# Patient Record
Sex: FEMALE | Race: ASIAN | Employment: FULL TIME | URBAN - METROPOLITAN AREA
[De-identification: names, ages, dates, MRNs, and addresses within clinical notes are randomized per-mention and may not be internally consistent; named-entity substitution may affect disease eponyms.]

---

## 2019-04-02 ENCOUNTER — TELEPHONE (OUTPATIENT)
Dept: SURGERY | Age: 29
End: 2019-04-02

## 2019-04-02 ENCOUNTER — OFFICE VISIT (OUTPATIENT)
Dept: SURGERY | Age: 29
End: 2019-04-02

## 2019-04-02 VITALS
SYSTOLIC BLOOD PRESSURE: 115 MMHG | RESPIRATION RATE: 16 BRPM | HEIGHT: 63 IN | HEART RATE: 61 BPM | WEIGHT: 146 LBS | OXYGEN SATURATION: 100 % | TEMPERATURE: 98.9 F | BODY MASS INDEX: 25.87 KG/M2 | DIASTOLIC BLOOD PRESSURE: 71 MMHG

## 2019-04-02 DIAGNOSIS — K80.20 SYMPTOMATIC CHOLELITHIASIS: Primary | ICD-10-CM

## 2019-04-02 RX ORDER — DESOGESTREL AND ETHINYL ESTRADIOL 0.15-0.03
KIT ORAL
Refills: 5 | COMMUNITY
Start: 2019-03-09 | End: 2021-05-25

## 2019-04-02 RX ORDER — OMEPRAZOLE 20 MG/1
CAPSULE, DELAYED RELEASE ORAL
Refills: 1 | COMMUNITY
Start: 2019-03-10 | End: 2019-05-10

## 2019-04-02 NOTE — PROGRESS NOTES
[facility] General Surgery History and Physical    History of Present Illness:      Denece Brunner is a 33 y.o. female who has been having epigastric and RUQ pain for the past few months. The pain seems to be random and sometimes worse when she is not eating. She says the pain is a 5 of 10 when it is bad. She has some associated nausea and bloating. She has been having normal BM.   She had an EGD by Dr Demetri Hidalgo which shows mild gastritis and she is on omeprazole for this    PMH - none    PSH - none    Current Outpatient Medications:     omeprazole (PRILOSEC) 20 mg capsule, TAKE ONE CAPSULE BY MOUTH EVERY DAY BEFORE BREAKFAST, Disp: , Rfl: 1    APRI 0.15-0.03 mg tab, TAKE 1 TABLET BY MOUTH EVERY DAY, Disp: , Rfl: 5    No Known Allergies    Social History     Socioeconomic History    Marital status:      Spouse name: Not on file    Number of children: Not on file    Years of education: Not on file    Highest education level: Not on file   Occupational History    Not on file   Social Needs    Financial resource strain: Not on file    Food insecurity:     Worry: Not on file     Inability: Not on file    Transportation needs:     Medical: Not on file     Non-medical: Not on file   Tobacco Use    Smoking status: Not on file   Substance and Sexual Activity    Alcohol use: Not on file    Drug use: Not on file    Sexual activity: Not on file   Lifestyle    Physical activity:     Days per week: Not on file     Minutes per session: Not on file    Stress: Not on file   Relationships    Social connections:     Talks on phone: Not on file     Gets together: Not on file     Attends [employer] service: Not on file     Active member of club or organization: Not on file     Attends meetings of clubs or organizations: Not on file     Relationship status: Not on file    Intimate partner violence:     Fear of current or ex partner: Not on file     Emotionally abused: Not on file     Physically abused: Not on file     Forced sexual activity: Not on file   Other Topics Concern    Not on file   Social History Narrative    Not on file       No family history on file. ROS   Constitutional: negative  Ears, Nose, Mouth, Throat, and Face: negative  Respiratory: negative  Cardiovascular: negative  Gastrointestinal: positive for nausea and abdominal pain  Genitourinary:negative  Integument/Breast: negative  Hematologic/Lymphatic: negative  Behavioral/Psychiatric: negative  Allergic/Immunologic: negative      Physical Exam:     Visit Vitals  /71 (BP 1 Location: Left arm, BP Patient Position: Sitting)   Pulse 61   Temp 98.9 °F (37.2 °C) (Oral)   Resp 16   Ht 5' 3\" (1.6 m)   Wt 146 lb (66.2 kg)   SpO2 100%   BMI 25.86 kg/m²       General - alert and oriented, no apparent distress  HEENT - no jaundice, no hearing imparement  Pulm - CTAB, no C/W/R  CV - RRR, no M/R/G  Abd - soft, ND, BS present, mild TTP in RUQ, no guarding, no hernia  Ext - pulses intact in UE and LE bilaterally, no edema  Skin - supple, no rashes  Psychiatric - normal affect, good mood    Labs  LFTs normal on outside labs    Imaging  RUQ US - 7mm stone in the neck of the GB, normal CBD, normal GB wall  I have reviewed and agree with all of the pertinent images    Assessment:     Adan Bacon is a 33 y.o. female with symptomatic cholelithiasis    Recommendations:     1. She has a gallstone that appears to be causing her pain and will need laparoscopic cholecystectomy in the OR. I have discussed the above procedure with the patient in detail. We reviewed the benefits and possible complications of the surgery which include bleeding, infection, damage to adjacent organs, venous thromboembolism, need for repeat surgery, death and other unforseen complications. The patient agreed to proceed with the surgery. Dionicio Ibarra MD    Ms. Terence Jim has a reminder for a \"due or due soon\" health maintenance.  I have asked that she contact her primary care provider for follow-up on this health maintenance.

## 2019-04-02 NOTE — H&P (VIEW-ONLY)
[facility] General Surgery History and Physical 
 
History of Present Illness:  
  
Patrick Draper is a 33 y.o. female who has been having epigastric and RUQ pain for the past few months. The pain seems to be random and sometimes worse when she is not eating. She says the pain is a 5 of 10 when it is bad. She has some associated nausea and bloating. She has been having normal BM. She had an EGD by Dr Eric Lee which shows mild gastritis and she is on omeprazole for this PMH - none PSH - none Current Outpatient Medications:  
  omeprazole (PRILOSEC) 20 mg capsule, TAKE ONE CAPSULE BY MOUTH EVERY DAY BEFORE BREAKFAST, Disp: , Rfl: 1   APRI 0.15-0.03 mg tab, TAKE 1 TABLET BY MOUTH EVERY DAY, Disp: , Rfl: 5 No Known Allergies Social History Socioeconomic History  Marital status:  Spouse name: Not on file  Number of children: Not on file  Years of education: Not on file  Highest education level: Not on file Occupational History  Not on file Social Needs  Financial resource strain: Not on file  Food insecurity:  
  Worry: Not on file Inability: Not on file  Transportation needs:  
  Medical: Not on file Non-medical: Not on file Tobacco Use  Smoking status: Not on file Substance and Sexual Activity  Alcohol use: Not on file  Drug use: Not on file  Sexual activity: Not on file Lifestyle  Physical activity:  
  Days per week: Not on file Minutes per session: Not on file  Stress: Not on file Relationships  Social connections:  
  Talks on phone: Not on file Gets together: Not on file Attends [employer] service: Not on file Active member of club or organization: Not on file Attends meetings of clubs or organizations: Not on file Relationship status: Not on file  Intimate partner violence:  
  Fear of current or ex partner: Not on file Emotionally abused: Not on file Physically abused: Not on file Forced sexual activity: Not on file Other Topics Concern  Not on file Social History Narrative  Not on file No family history on file. ROS Constitutional: negative Ears, Nose, Mouth, Throat, and Face: negative Respiratory: negative Cardiovascular: negative Gastrointestinal: positive for nausea and abdominal pain Genitourinary:negative Integument/Breast: negative Hematologic/Lymphatic: negative Behavioral/Psychiatric: negative Allergic/Immunologic: negative Physical Exam:  
 
Visit Vitals /71 (BP 1 Location: Left arm, BP Patient Position: Sitting) Pulse 61 Temp 98.9 °F (37.2 °C) (Oral) Resp 16 Ht 5' 3\" (1.6 m) Wt 146 lb (66.2 kg) SpO2 100% BMI 25.86 kg/m² General - alert and oriented, no apparent distress HEENT - no jaundice, no hearing imparement Pulm - CTAB, no C/W/R 
CV - RRR, no M/R/G Abd - soft, ND, BS present, mild TTP in RUQ, no guarding, no hernia Ext - pulses intact in UE and LE bilaterally, no edema Skin - supple, no rashes Psychiatric - normal affect, good mood Labs LFTs normal on outside labs Imaging RUQ US - 7mm stone in the neck of the GB, normal CBD, normal GB wall I have reviewed and agree with all of the pertinent images Assessment:  
 
Adan Bacon is a 33 y.o. female with symptomatic cholelithiasis Recommendations:  
 
1. She has a gallstone that appears to be causing her pain and will need laparoscopic cholecystectomy in the OR. I have discussed the above procedure with the patient in detail. We reviewed the benefits and possible complications of the surgery which include bleeding, infection, damage to adjacent organs, venous thromboembolism, need for repeat surgery, death and other unforseen complications. The patient agreed to proceed with the surgery. Dionicio Ibarra MD Ms. Terencezina Jim has a reminder for a \"due or due soon\" health maintenance.  I have asked that she contact her primary care provider for follow-up on this health maintenance.

## 2019-04-02 NOTE — PROGRESS NOTES
1. Have you been to the ER, urgent care clinic since your last visit? Hospitalized since your last visit? No    2. Have you seen or consulted any other health care providers outside of the [address] [place] [address] since your last visit? Include any pap smears or colon screening.  No

## 2019-04-08 ENCOUNTER — ANESTHESIA EVENT (OUTPATIENT)
Dept: SURGERY | Age: 29
End: 2019-04-08
Payer: COMMERCIAL

## 2019-04-11 ENCOUNTER — ANESTHESIA (OUTPATIENT)
Dept: SURGERY | Age: 29
End: 2019-04-11
Payer: COMMERCIAL

## 2019-04-11 ENCOUNTER — HOSPITAL ENCOUNTER (OUTPATIENT)
Age: 29
Setting detail: OUTPATIENT SURGERY
Discharge: HOME | End: 2019-04-11
Attending: SURGERY | Admitting: SURGERY
Payer: COMMERCIAL

## 2019-04-11 VITALS
SYSTOLIC BLOOD PRESSURE: 107 MMHG | HEIGHT: 63 IN | WEIGHT: 146 LBS | HEART RATE: 73 BPM | BODY MASS INDEX: 25.87 KG/M2 | RESPIRATION RATE: 17 BRPM | TEMPERATURE: 98.2 F | DIASTOLIC BLOOD PRESSURE: 56 MMHG | OXYGEN SATURATION: 99 %

## 2019-04-11 DIAGNOSIS — K80.20 SYMPTOMATIC CHOLELITHIASIS: Primary | ICD-10-CM

## 2019-04-11 LAB — HCG UR QL: NEGATIVE

## 2019-04-11 PROCEDURE — 77030009852 HC PCH RTVR ENDOSC COVD -B: Performed by: SURGERY

## 2019-04-11 PROCEDURE — 74011000250 HC RX REV CODE- 250

## 2019-04-11 PROCEDURE — 77030008684 HC TU ET CUF COVD -B: Performed by: NURSE ANESTHETIST, CERTIFIED REGISTERED

## 2019-04-11 PROCEDURE — 77030026438 HC STYL ET INTUB CARD -A: Performed by: NURSE ANESTHETIST, CERTIFIED REGISTERED

## 2019-04-11 PROCEDURE — 77030002895 HC DEV VASC CLOSR COVD -B: Performed by: SURGERY

## 2019-04-11 PROCEDURE — 76210000021 HC REC RM PH II 0.5 TO 1 HR: Performed by: SURGERY

## 2019-04-11 PROCEDURE — 77030020053 HC ELECTRD LAPSCP COVD -B: Performed by: SURGERY

## 2019-04-11 PROCEDURE — 74011000250 HC RX REV CODE- 250: Performed by: SURGERY

## 2019-04-11 PROCEDURE — 74011250637 HC RX REV CODE- 250/637: Performed by: ANESTHESIOLOGY

## 2019-04-11 PROCEDURE — 76210000017 HC OR PH I REC 1.5 TO 2 HR: Performed by: SURGERY

## 2019-04-11 PROCEDURE — 76010000149 HC OR TIME 1 TO 1.5 HR: Performed by: SURGERY

## 2019-04-11 PROCEDURE — 76060000033 HC ANESTHESIA 1 TO 1.5 HR: Performed by: SURGERY

## 2019-04-11 PROCEDURE — 74011250636 HC RX REV CODE- 250/636

## 2019-04-11 PROCEDURE — 88304 TISSUE EXAM BY PATHOLOGIST: CPT

## 2019-04-11 PROCEDURE — 77030035051: Performed by: SURGERY

## 2019-04-11 PROCEDURE — 74011250637 HC RX REV CODE- 250/637: Performed by: SURGERY

## 2019-04-11 PROCEDURE — 77030011640 HC PAD GRND REM COVD -A: Performed by: SURGERY

## 2019-04-11 PROCEDURE — 77030020782 HC GWN BAIR PAWS FLX 3M -B

## 2019-04-11 PROCEDURE — 77030035048 HC TRCR ENDOSC OPTCL COVD -B: Performed by: SURGERY

## 2019-04-11 PROCEDURE — 77030035045 HC TRCR ENDOSC VRSPRT BLDLSS COVD -B: Performed by: SURGERY

## 2019-04-11 PROCEDURE — 77030012029 HC APPL CLP LIG COVD -C: Performed by: SURGERY

## 2019-04-11 PROCEDURE — 77030008756 HC TU IRR SUC STRY -B: Performed by: SURGERY

## 2019-04-11 PROCEDURE — 77030037032 HC INSRT SCIS CLICKLLINE DISP STOR -B: Performed by: SURGERY

## 2019-04-11 PROCEDURE — 77030002933 HC SUT MCRYL J&J -A: Performed by: SURGERY

## 2019-04-11 PROCEDURE — 77030040361 HC SLV COMPR DVT MDII -B: Performed by: SURGERY

## 2019-04-11 PROCEDURE — 74011250636 HC RX REV CODE- 250/636: Performed by: ANESTHESIOLOGY

## 2019-04-11 PROCEDURE — 77030020747 HC TU INSUF ENDOSC TELE -A: Performed by: SURGERY

## 2019-04-11 PROCEDURE — 77030031139 HC SUT VCRL2 J&J -A: Performed by: SURGERY

## 2019-04-11 PROCEDURE — 74011250636 HC RX REV CODE- 250/636: Performed by: SURGERY

## 2019-04-11 PROCEDURE — 77030008771 HC TU NG SALEM SUMP -A: Performed by: NURSE ANESTHETIST, CERTIFIED REGISTERED

## 2019-04-11 PROCEDURE — 81025 URINE PREGNANCY TEST: CPT

## 2019-04-11 PROCEDURE — 77030020263 HC SOL INJ SOD CL0.9% LFCR 1000ML: Performed by: SURGERY

## 2019-04-11 PROCEDURE — 77030039266 HC ADH SKN EXOFIN S2SG -A: Performed by: SURGERY

## 2019-04-11 RX ORDER — OXYCODONE AND ACETAMINOPHEN 5; 325 MG/1; MG/1
1-2 TABLET ORAL
Qty: 40 TAB | Refills: 0 | Status: SHIPPED | OUTPATIENT
Start: 2019-04-11 | End: 2019-04-14

## 2019-04-11 RX ORDER — MIDAZOLAM HYDROCHLORIDE 1 MG/ML
1 INJECTION, SOLUTION INTRAMUSCULAR; INTRAVENOUS AS NEEDED
Status: DISCONTINUED | OUTPATIENT
Start: 2019-04-11 | End: 2019-04-11 | Stop reason: HOSPADM

## 2019-04-11 RX ORDER — GLYCOPYRROLATE 0.6MG/3ML
SYRINGE (ML) INTRAVENOUS AS NEEDED
Status: DISCONTINUED | OUTPATIENT
Start: 2019-04-11 | End: 2019-04-11 | Stop reason: HOSPADM

## 2019-04-11 RX ORDER — ROCURONIUM BROMIDE 10 MG/ML
INJECTION, SOLUTION INTRAVENOUS AS NEEDED
Status: DISCONTINUED | OUTPATIENT
Start: 2019-04-11 | End: 2019-04-11 | Stop reason: HOSPADM

## 2019-04-11 RX ORDER — DEXAMETHASONE SODIUM PHOSPHATE 4 MG/ML
INJECTION, SOLUTION INTRA-ARTICULAR; INTRALESIONAL; INTRAMUSCULAR; INTRAVENOUS; SOFT TISSUE AS NEEDED
Status: DISCONTINUED | OUTPATIENT
Start: 2019-04-11 | End: 2019-04-11 | Stop reason: HOSPADM

## 2019-04-11 RX ORDER — ROPIVACAINE HYDROCHLORIDE 5 MG/ML
30 INJECTION, SOLUTION EPIDURAL; INFILTRATION; PERINEURAL ONCE
Status: DISCONTINUED | OUTPATIENT
Start: 2019-04-11 | End: 2019-04-11 | Stop reason: HOSPADM

## 2019-04-11 RX ORDER — SODIUM CHLORIDE 0.9 % (FLUSH) 0.9 %
5-40 SYRINGE (ML) INJECTION AS NEEDED
Status: DISCONTINUED | OUTPATIENT
Start: 2019-04-11 | End: 2019-04-11 | Stop reason: HOSPADM

## 2019-04-11 RX ORDER — SUCCINYLCHOLINE CHLORIDE 20 MG/ML
INJECTION INTRAMUSCULAR; INTRAVENOUS AS NEEDED
Status: DISCONTINUED | OUTPATIENT
Start: 2019-04-11 | End: 2019-04-11 | Stop reason: HOSPADM

## 2019-04-11 RX ORDER — BUPIVACAINE HYDROCHLORIDE AND EPINEPHRINE 5; 5 MG/ML; UG/ML
INJECTION, SOLUTION EPIDURAL; INTRACAUDAL; PERINEURAL AS NEEDED
Status: DISCONTINUED | OUTPATIENT
Start: 2019-04-11 | End: 2019-04-11 | Stop reason: HOSPADM

## 2019-04-11 RX ORDER — MORPHINE SULFATE 10 MG/ML
2 INJECTION, SOLUTION INTRAMUSCULAR; INTRAVENOUS
Status: DISCONTINUED | OUTPATIENT
Start: 2019-04-11 | End: 2019-04-11 | Stop reason: HOSPADM

## 2019-04-11 RX ORDER — SODIUM CHLORIDE 0.9 % (FLUSH) 0.9 %
5-40 SYRINGE (ML) INJECTION EVERY 8 HOURS
Status: DISCONTINUED | OUTPATIENT
Start: 2019-04-11 | End: 2019-04-11 | Stop reason: HOSPADM

## 2019-04-11 RX ORDER — ACETAMINOPHEN 325 MG/1
650 TABLET ORAL ONCE
Status: COMPLETED | OUTPATIENT
Start: 2019-04-11 | End: 2019-04-11

## 2019-04-11 RX ORDER — FENTANYL CITRATE 50 UG/ML
25 INJECTION, SOLUTION INTRAMUSCULAR; INTRAVENOUS
Status: DISCONTINUED | OUTPATIENT
Start: 2019-04-11 | End: 2019-04-11 | Stop reason: HOSPADM

## 2019-04-11 RX ORDER — DEXMEDETOMIDINE HYDROCHLORIDE 4 UG/ML
INJECTION, SOLUTION INTRAVENOUS AS NEEDED
Status: DISCONTINUED | OUTPATIENT
Start: 2019-04-11 | End: 2019-04-11 | Stop reason: HOSPADM

## 2019-04-11 RX ORDER — SODIUM CHLORIDE, SODIUM LACTATE, POTASSIUM CHLORIDE, CALCIUM CHLORIDE 600; 310; 30; 20 MG/100ML; MG/100ML; MG/100ML; MG/100ML
75 INJECTION, SOLUTION INTRAVENOUS CONTINUOUS
Status: DISCONTINUED | OUTPATIENT
Start: 2019-04-11 | End: 2019-04-11 | Stop reason: HOSPADM

## 2019-04-11 RX ORDER — SODIUM CHLORIDE 9 MG/ML
25 INJECTION, SOLUTION INTRAVENOUS CONTINUOUS
Status: DISCONTINUED | OUTPATIENT
Start: 2019-04-11 | End: 2019-04-11 | Stop reason: HOSPADM

## 2019-04-11 RX ORDER — KETOROLAC TROMETHAMINE 30 MG/ML
INJECTION, SOLUTION INTRAMUSCULAR; INTRAVENOUS AS NEEDED
Status: DISCONTINUED | OUTPATIENT
Start: 2019-04-11 | End: 2019-04-11 | Stop reason: HOSPADM

## 2019-04-11 RX ORDER — MIDAZOLAM HYDROCHLORIDE 1 MG/ML
0.5 INJECTION, SOLUTION INTRAMUSCULAR; INTRAVENOUS
Status: DISCONTINUED | OUTPATIENT
Start: 2019-04-11 | End: 2019-04-11 | Stop reason: HOSPADM

## 2019-04-11 RX ORDER — SODIUM CHLORIDE, SODIUM LACTATE, POTASSIUM CHLORIDE, CALCIUM CHLORIDE 600; 310; 30; 20 MG/100ML; MG/100ML; MG/100ML; MG/100ML
125 INJECTION, SOLUTION INTRAVENOUS CONTINUOUS
Status: DISCONTINUED | OUTPATIENT
Start: 2019-04-11 | End: 2019-04-11 | Stop reason: HOSPADM

## 2019-04-11 RX ORDER — FENTANYL CITRATE 50 UG/ML
50 INJECTION, SOLUTION INTRAMUSCULAR; INTRAVENOUS AS NEEDED
Status: DISCONTINUED | OUTPATIENT
Start: 2019-04-11 | End: 2019-04-11 | Stop reason: HOSPADM

## 2019-04-11 RX ORDER — FENTANYL CITRATE 50 UG/ML
INJECTION, SOLUTION INTRAMUSCULAR; INTRAVENOUS AS NEEDED
Status: DISCONTINUED | OUTPATIENT
Start: 2019-04-11 | End: 2019-04-11 | Stop reason: HOSPADM

## 2019-04-11 RX ORDER — PROPOFOL 10 MG/ML
INJECTION, EMULSION INTRAVENOUS AS NEEDED
Status: DISCONTINUED | OUTPATIENT
Start: 2019-04-11 | End: 2019-04-11 | Stop reason: HOSPADM

## 2019-04-11 RX ORDER — ONDANSETRON 2 MG/ML
INJECTION INTRAMUSCULAR; INTRAVENOUS AS NEEDED
Status: DISCONTINUED | OUTPATIENT
Start: 2019-04-11 | End: 2019-04-11 | Stop reason: HOSPADM

## 2019-04-11 RX ORDER — LIDOCAINE HYDROCHLORIDE 10 MG/ML
0.1 INJECTION, SOLUTION EPIDURAL; INFILTRATION; INTRACAUDAL; PERINEURAL AS NEEDED
Status: DISCONTINUED | OUTPATIENT
Start: 2019-04-11 | End: 2019-04-11 | Stop reason: HOSPADM

## 2019-04-11 RX ORDER — MIDAZOLAM HYDROCHLORIDE 1 MG/ML
INJECTION, SOLUTION INTRAMUSCULAR; INTRAVENOUS AS NEEDED
Status: DISCONTINUED | OUTPATIENT
Start: 2019-04-11 | End: 2019-04-11 | Stop reason: HOSPADM

## 2019-04-11 RX ORDER — LIDOCAINE HYDROCHLORIDE 20 MG/ML
INJECTION, SOLUTION EPIDURAL; INFILTRATION; INTRACAUDAL; PERINEURAL AS NEEDED
Status: DISCONTINUED | OUTPATIENT
Start: 2019-04-11 | End: 2019-04-11 | Stop reason: HOSPADM

## 2019-04-11 RX ORDER — CEFAZOLIN SODIUM/WATER 2 G/20 ML
2 SYRINGE (ML) INTRAVENOUS ONCE
Status: COMPLETED | OUTPATIENT
Start: 2019-04-11 | End: 2019-04-11

## 2019-04-11 RX ORDER — SODIUM CHLORIDE, SODIUM LACTATE, POTASSIUM CHLORIDE, CALCIUM CHLORIDE 600; 310; 30; 20 MG/100ML; MG/100ML; MG/100ML; MG/100ML
INJECTION, SOLUTION INTRAVENOUS
Status: DISCONTINUED | OUTPATIENT
Start: 2019-04-11 | End: 2019-04-11 | Stop reason: HOSPADM

## 2019-04-11 RX ORDER — ONDANSETRON 2 MG/ML
4 INJECTION INTRAMUSCULAR; INTRAVENOUS AS NEEDED
Status: DISCONTINUED | OUTPATIENT
Start: 2019-04-11 | End: 2019-04-11 | Stop reason: HOSPADM

## 2019-04-11 RX ORDER — ONDANSETRON 4 MG/1
4 TABLET, ORALLY DISINTEGRATING ORAL
Qty: 30 TAB | Refills: 0 | Status: SHIPPED | OUTPATIENT
Start: 2019-04-11 | End: 2019-05-10

## 2019-04-11 RX ORDER — NEOSTIGMINE METHYLSULFATE 1 MG/ML
INJECTION INTRAVENOUS AS NEEDED
Status: DISCONTINUED | OUTPATIENT
Start: 2019-04-11 | End: 2019-04-11 | Stop reason: HOSPADM

## 2019-04-11 RX ORDER — HYDROMORPHONE HYDROCHLORIDE 1 MG/ML
0.2 INJECTION, SOLUTION INTRAMUSCULAR; INTRAVENOUS; SUBCUTANEOUS
Status: DISCONTINUED | OUTPATIENT
Start: 2019-04-11 | End: 2019-04-11 | Stop reason: HOSPADM

## 2019-04-11 RX ORDER — DIPHENHYDRAMINE HYDROCHLORIDE 50 MG/ML
12.5 INJECTION, SOLUTION INTRAMUSCULAR; INTRAVENOUS AS NEEDED
Status: DISCONTINUED | OUTPATIENT
Start: 2019-04-11 | End: 2019-04-11 | Stop reason: HOSPADM

## 2019-04-11 RX ORDER — OXYCODONE AND ACETAMINOPHEN 5; 325 MG/1; MG/1
1 TABLET ORAL ONCE
Status: COMPLETED | OUTPATIENT
Start: 2019-04-11 | End: 2019-04-11

## 2019-04-11 RX ORDER — EPHEDRINE SULFATE 50 MG/ML
INJECTION, SOLUTION INTRAVENOUS AS NEEDED
Status: DISCONTINUED | OUTPATIENT
Start: 2019-04-11 | End: 2019-04-11 | Stop reason: HOSPADM

## 2019-04-11 RX ADMIN — DEXMEDETOMIDINE HYDROCHLORIDE 2 MCG: 4 INJECTION, SOLUTION INTRAVENOUS at 12:57

## 2019-04-11 RX ADMIN — Medication 2 G: at 12:26

## 2019-04-11 RX ADMIN — NEOSTIGMINE METHYLSULFATE 1 MG: 1 INJECTION INTRAVENOUS at 12:58

## 2019-04-11 RX ADMIN — DEXMEDETOMIDINE HYDROCHLORIDE 4 MCG: 4 INJECTION, SOLUTION INTRAVENOUS at 12:53

## 2019-04-11 RX ADMIN — PROPOFOL 150 MG: 10 INJECTION, EMULSION INTRAVENOUS at 12:21

## 2019-04-11 RX ADMIN — FENTANYL CITRATE 50 MCG: 50 INJECTION, SOLUTION INTRAMUSCULAR; INTRAVENOUS at 12:11

## 2019-04-11 RX ADMIN — SODIUM CHLORIDE, SODIUM LACTATE, POTASSIUM CHLORIDE, AND CALCIUM CHLORIDE 125 ML/HR: 600; 310; 30; 20 INJECTION, SOLUTION INTRAVENOUS at 11:13

## 2019-04-11 RX ADMIN — Medication 0.2 MG: at 12:56

## 2019-04-11 RX ADMIN — DEXAMETHASONE SODIUM PHOSPHATE 8 MG: 4 INJECTION, SOLUTION INTRA-ARTICULAR; INTRALESIONAL; INTRAMUSCULAR; INTRAVENOUS; SOFT TISSUE at 12:27

## 2019-04-11 RX ADMIN — FENTANYL CITRATE 50 MCG: 50 INJECTION, SOLUTION INTRAMUSCULAR; INTRAVENOUS at 12:18

## 2019-04-11 RX ADMIN — SUCCINYLCHOLINE CHLORIDE 120 MG: 20 INJECTION INTRAMUSCULAR; INTRAVENOUS at 12:21

## 2019-04-11 RX ADMIN — KETOROLAC TROMETHAMINE 30 MG: 30 INJECTION, SOLUTION INTRAMUSCULAR; INTRAVENOUS at 12:56

## 2019-04-11 RX ADMIN — DEXMEDETOMIDINE HYDROCHLORIDE 2 MCG: 4 INJECTION, SOLUTION INTRAVENOUS at 12:58

## 2019-04-11 RX ADMIN — SODIUM CHLORIDE, SODIUM LACTATE, POTASSIUM CHLORIDE, CALCIUM CHLORIDE: 600; 310; 30; 20 INJECTION, SOLUTION INTRAVENOUS at 12:11

## 2019-04-11 RX ADMIN — LIDOCAINE HYDROCHLORIDE 50 MG: 20 INJECTION, SOLUTION EPIDURAL; INFILTRATION; INTRACAUDAL; PERINEURAL at 12:21

## 2019-04-11 RX ADMIN — ONDANSETRON 4 MG: 2 INJECTION INTRAMUSCULAR; INTRAVENOUS at 12:25

## 2019-04-11 RX ADMIN — ACETAMINOPHEN 650 MG: 325 TABLET ORAL at 11:16

## 2019-04-11 RX ADMIN — EPHEDRINE SULFATE 10 MG: 50 INJECTION, SOLUTION INTRAVENOUS at 12:44

## 2019-04-11 RX ADMIN — NEOSTIGMINE METHYLSULFATE 1 MG: 1 INJECTION INTRAVENOUS at 12:57

## 2019-04-11 RX ADMIN — MIDAZOLAM HYDROCHLORIDE 2 MG: 1 INJECTION, SOLUTION INTRAMUSCULAR; INTRAVENOUS at 12:11

## 2019-04-11 RX ADMIN — ROCURONIUM BROMIDE 5 MG: 10 INJECTION, SOLUTION INTRAVENOUS at 12:21

## 2019-04-11 RX ADMIN — Medication 0.2 MG: at 12:58

## 2019-04-11 RX ADMIN — OXYCODONE AND ACETAMINOPHEN 1 TABLET: 5; 325 TABLET ORAL at 15:29

## 2019-04-11 NOTE — ANESTHESIA PREPROCEDURE EVALUATION
Relevant Problems   No relevant active problems       Anesthetic History   No history of anesthetic complications            Review of Systems / Medical History  Patient summary reviewed, nursing notes reviewed and pertinent labs reviewed    Pulmonary  Within defined limits                 Neuro/Psych   Within defined limits           Cardiovascular  Within defined limits                     GI/Hepatic/Renal  Within defined limits              Endo/Other  Within defined limits           Other Findings              Physical Exam    Airway  Mallampati: I  TM Distance: > 6 cm  Neck ROM: normal range of motion   Mouth opening: Normal     Cardiovascular  Regular rate and rhythm,  S1 and S2 normal,  no murmur, click, rub, or gallop             Dental  No notable dental hx       Pulmonary  Breath sounds clear to auscultation               Abdominal  GI exam deferred       Other Findings            Anesthetic Plan    ASA: 2  Anesthesia type: general          Induction: Intravenous  Anesthetic plan and risks discussed with: Patient

## 2019-04-11 NOTE — DISCHARGE INSTRUCTIONS
Laparoscopic cholecystectomy      Patient Discharge Instructions    Wagner Douglas / [id number] : 1990    Admitted 2019 Discharged: 2019       PATIENT INSTRUCTIONS  GALLBLADDER SURGERY  (CHOLECYSTECTOMY)    FOLLOW-UP:  Please make an appointment with your physician in 10 - 14 day(s). Call your physician immediately if you have any fevers greater than 101.5, drainage from your wound that is not clear or looks infected, persistent bleeding, increasing abdominal pain, problems urinating, or persistent nausea/vomiting. You should be aware that you may have right shoulder pain after surgery and that this will progressively go away. This is called 'referred pain' and is from the area of the gallbladder. It can also be caused by gas that may be trapped under the diaphragm from the surgery, especially if it was performed laparoscopically through mini-incisions. This gas will progressively get reabsorbed by your body. WOUND CARE INSTRUCTIONS:   You may shower at home. If clothing rubs against the wound or causes irritation and the wound is not draining you may cover it with a dry dressing during the daytime. Try to keep the wound dry and avoid ointments on the wound unless directed to do so. If the wound becomes bright red and painful or starts to drain infected material that is not clear, please contact your physician immediately. You should also call if you begin to drain fluid that is thin and greenish-brown from the wound and appears to look like bile. If the wound though is mildly pink and has a thick firm ridge underneath it, this is normal, and is referred to as a healing ridge. This will resolve over the next 4-6 weeks. Place an ice pack on the navel incision for the next 48 hours. After that, you may use a heating pad if you feel muscle tightening or pulling. DIET:  You may eat any foods that you can tolerate.   It is a good idea to eat a high fiber diet and take in plenty of fluids to prevent constipation. If you do become constipated you may want to take a mild laxative or take ducolax tablets on a daily basis until your bowel habits are regular. Constipation can be very uncomfortable, along with straining, after recent abdominal surgery. ACTIVITY:  You are encouraged to cough and deep breath or use your incentive spirometer if you were given one, every 15-30 minutes when awake. This will help prevent respiratory complications and low grade fevers post-operatively. You may want to hug a pillow when coughing and sneezing to add additional support to the surgical area(s) which will decrease pain during these times. You are encouraged to walk and engage in light activity for the next two weeks. You should not lift more than 20 pounds during this time frame as it could put you at increased risk for a post-operative hernia. Twenty pounds is roughly equivalent to a plastic bag of groceries. · Most people are able to return to work within 1 to 2 weeks after surgery. · You may shower 24 hours after surgery. Pat the cut (incision) dry. Do not take a bath for the first week. · Your doctor will tell you when you can have sex again. MEDICATIONS:  Try to take narcotic medications and anti-inflammatory medications, such as tylenol, ibuprofen, naprosyn, etc., with food. This will minimize stomach upset from the medication. Should you develop nausea and vomiting from the pain medication, or develop a rash, please discontinue the medication and contact your physician. You should not drive, make important decisions, or operate machinery when taking narcotic pain medication. · Take ibuprofen (Motrin) as scheduled then combine with oxycodone/acetaminophen (Percocet, Roxicet, Tylox) as needed for severe pain. QUESTIONS:  Please feel free to call Dr. Rosie Mendez office ([phone]) if you have any questions, and they will be glad to assist you. Follow-up with Dr. Tomi Mike in 2 week(s). Call the office to schedule your appointment. Information obtained by :    I understand that if any problems occur once I am at home I am to contact my physician. I understand and acknowledge receipt of the instructions indicated above. Physician's or R.N.'s Signature                                                                  Date/Time                                                                                                                                              Patient or Representative Signature                                                          Date/Time     ______________________________________________________________________    Anesthesia Discharge Instructions    After general anesthesia or intervenous sedation, for 24 hours or while taking prescription Narcotics:  · Limit your activities  · Do not drive or operate hazardous machinery  · If you have not urinated within 8 hours after discharge, please contact your surgeon on call. · Do not make important personal or business decisions  · Do not drink alcoholic beverages    Report the following to your surgeon:  · Excessive pain, swelling, redness or odor of or around the surgical area  · Temperature over 100.5 degrees  · Nausea and vomiting lasting longer than 4 hours or if unable to take medication  · Any signs of decreased circulation or nerve impairment to extremity:  Change in color, persistent numbness, tingling, coldness or increased pain.   · Any questions

## 2019-04-11 NOTE — PERIOP NOTES
Patient: Kurt Nguyen MRN: [id number]  SSN: [id number]   YOB: 1990  Age: 33 y.o. Sex: female     Patient is status post Procedure(s):  LAPAROSCOPIC CHOLECYSTECTOMY.     Surgeon(s) and Role:     * Carlos Alberto Porras MD - Primary    Local/Dose/Irrigation:  0.5% BUPIVACAINE W EPI                   Peripheral IV 04/11/19 Left Hand (Active)   Site Assessment Clean, dry, & intact 4/11/2019 11:12 AM   Phlebitis Assessment 0 4/11/2019 11:12 AM   Infiltration Assessment 0 4/11/2019 11:12 AM   Dressing Status New 4/11/2019 11:12 AM   Dressing Type Tape;Transparent 4/11/2019 11:12 AM   Hub Color/Line Status Pink 4/11/2019 11:12 AM                           Dressing/Packing:  Wound Abdomen 4 PORT SITES-Dressing Type: Topical skin adhesive/glue (04/11/19 1300)    Splint/Cast:  ]    Other:

## 2019-04-11 NOTE — ANESTHESIA POSTPROCEDURE EVALUATION
Procedure(s):  LAPAROSCOPIC CHOLECYSTECTOMY. general    Anesthesia Post Evaluation        Patient location during evaluation: PACU  Patient participation: complete - patient participated  Level of consciousness: awake and alert  Pain management: adequate  Airway patency: patent  Anesthetic complications: no  Cardiovascular status: acceptable  Respiratory status: acceptable  Hydration status: acceptable  Comments: I have seen and evaluated the patient and is ready for discharge.  Mindy Klein MD    Post anesthesia nausea and vomiting:  none      Vitals Value Taken Time   /56 4/11/2019  3:00 PM   Temp 36.9 °C (98.4 °F) 4/11/2019  2:35 PM   Pulse 73 4/11/2019  3:07 PM   Resp 17 4/11/2019  3:07 PM   SpO2 99 % 4/11/2019  3:07 PM

## 2019-04-11 NOTE — BRIEF OP NOTE
BRIEF OPERATIVE NOTE    Date of Procedure: 4/11/2019   Preoperative Diagnosis: SYMPTOMATIC CHOLELITHIASIS  Postoperative Diagnosis: SYMPTOMATIC CHOLELITHIASIS    Procedure(s):  LAPAROSCOPIC CHOLECYSTECTOMY  Surgeon(s) and Role:     Sebastián Caldwell MD - Primary         Surgical Assistant: KARRI Og    Surgical Staff:  Circ-1: Amaya Aj RN  Circ-Intern: Iron Mason RN  Physician Assistant: Romana Kitchen, PA  Scrub Tech-1: Jose Patel  Retractor Herrera: Digna Jalloh  Event Time In Time Out   Incision Start 1237    Incision Close 1303      Anesthesia: General   Estimated Blood Loss: none  Specimens:   ID Type Source Tests Collected by Time Destination   1 : GALLBLADDER Fresh Gallbladder  Elsa Gil MD 4/11/2019 1301 Pathology      Findings: normal appearing GB with small stones   Complications: none  Implants: * No implants in log *

## 2019-04-11 NOTE — INTERVAL H&P NOTE
H&P Update:  Jorge Parikh was seen and examined. History and physical has been reviewed. The patient has been examined. There have been no significant clinical changes since the completion of the originally dated History and Physical.  Patient identified by surgeon; surgical site was confirmed by patient and surgeon.

## 2019-04-12 NOTE — OP NOTES
[facility]  OPERATIVE REPORT    Name:  Eligio Rayo  MR#:  [id number]  :  1990  ACCOUNT #:  [de-identified]  DATE OF SERVICE:  2019      PREOPERATIVE DIAGNOSIS:  Symptomatic cholelithiasis. POSTOPERATIVE DIAGNOSIS:  Symptomatic cholelithiasis. PROCEDURE PERFORMED:  Laparoscopic cholecystectomy. SURGEON:  Maykel Mancini MD    ASSISTANT:  KARRI Mathur    ANESTHESIA:  General.    COMPLICATIONS:  None. SPECIMENS REMOVED:  Gallbladder. IMPLANTS:  None. ESTIMATED BLOOD LOSS:  None. FINDINGS:  Normal-appearing gallbladder with small stones. INDICATIONS FOR THE OPERATION:  The patient is a 50-year-old female who has been having epigastric and right upper quadrant abdominal pain. She has had a preoperative endoscopy which was normal.  She has stones in the gallbladder and is needing laparoscopic cholecystectomy in the operating room. DESCRIPTION OF THE OPERATION:  The patient was met in the preop holding area. The H and P was updated. Consent was signed. All risks and benefits were explained to the patient prior to the start of the operation. She was taken back to the operating room. She was lying in a supine position. The abdomen was prepped and draped in standard sterile fashion. Time-out was called. Antibiotics were given. SCDs were on lower extremities. I started the operation by making a 5-mm incision into the right upper quadrant inserting a VisiPort trocar in the intraabdominal cavity, insufflating to 15 mmHg. We then placed a 13-TZ periumbilical trocar, 5-mm subxiphoid trocar and a 5-mm right-sided trocar. We then grasped the gallbladder, pushed it up and over the liver. We then dissected down to the infundibulum of the gallbladder dissecting free the cystic duct and cystic artery. We identified both structures clearly going into the gallbladder with no intervening structures in between. A critical view of safety was obtained.   We could see the junction of the cystic duct and common bile duct. We then completed our dissection around the cystic duct. We placed 2 clips on the cystic artery on the patient's side, 1 on the distal side and cut in between. We placed 3 clips on the cystic duct on the patient's side, one on the distal side and cut in between. We then removed the gallbladder from the gallbladder fossa with hook cautery cauterizing any bleeding from liver bed along the way. We then removed the gallbladder from the 12-mm EndoCatch bag. We looked up in the right upper quadrant. There was a little bit of spillage of bile from the dissection of the gallbladder fossa which was suctioned and irrigated out with 500 mL of saline irrigation. The gallbladder fossa was hemostatic and our clips were all in place. We then closed our 93-ZY periumbilical trocar site fascial defect with an Endoclose suture passing device in an interrupted figure-of-eight fashion. We then desufflated the abdominal cavity, removed the trocars and closed the skin with 4-0 Monocryl and Dermabond to complete the operation. Dr. Gaetano Sams was present and scrubbed during the entire operation. The counts were correct.         Chavez Kan MD      [id number]  D:  04/11/2019 13:14  T:  04/11/2019 13:16  JOB #:  [id number]

## 2019-05-10 ENCOUNTER — OFFICE VISIT (OUTPATIENT)
Dept: SURGERY | Age: 29
End: 2019-05-10

## 2019-05-10 VITALS
HEART RATE: 62 BPM | DIASTOLIC BLOOD PRESSURE: 78 MMHG | RESPIRATION RATE: 16 BRPM | BODY MASS INDEX: 25.87 KG/M2 | WEIGHT: 146 LBS | HEIGHT: 63 IN | OXYGEN SATURATION: 99 % | TEMPERATURE: 98 F | SYSTOLIC BLOOD PRESSURE: 106 MMHG

## 2019-05-10 DIAGNOSIS — Z09 FOLLOW-UP EXAMINATION AFTER ABDOMINAL SURGERY: Primary | ICD-10-CM

## 2019-05-10 NOTE — PATIENT INSTRUCTIONS
Ok to remove the glue and wash as normal     Ok to swim     Ok to use moisturizer on the scars     Avoid sun exposure and use sun block on the incisions     For bowels add magnesium 250 mg every day     Ok to add a women's multivitamin with iron every day

## 2019-05-10 NOTE — PROGRESS NOTES
1. Have you been to the ER, urgent care clinic since your last visit? Hospitalized since your last visit? No    2. Have you seen or consulted any other health care providers outside of the [address] [place] [address] since your last visit? Include any pap smears or colon screening.  No

## 2019-05-10 NOTE — PROGRESS NOTES
HISTORY OF PRESENT ILLNESS  Karie Maxwell is a 33 y.o. female. HPI  Chief Complaint   Patient presents with    Post OP Follow Up     4 weeks post lap ankush       Review of Systems   Constitutional: Negative for chills, fever and malaise/fatigue. Feels well      Respiratory: Negative for cough and shortness of breath. Cardiovascular: Negative for chest pain, palpitations and leg swelling. Gastrointestinal: Negative for abdominal pain, blood in stool, constipation, diarrhea, heartburn, nausea and vomiting. Genitourinary: Negative for dysuria. Musculoskeletal: Negative for myalgias. Started waking and running again    Neurological: Negative for dizziness. Endo/Heme/Allergies: Does not bruise/bleed easily. Physical Exam   Constitutional: She is oriented to person, place, and time. No distress. /78 (BP 1 Location: Left arm, BP Patient Position: Sitting)   Pulse 62   Temp 98 °F (36.7 °C) (Oral)   Resp 16   Ht 5' 3\" (1.6 m)   Wt 146 lb (66.2 kg)   LMP  (LMP Unknown)   SpO2 99%   BMI 25.86 kg/m²   Appears well      Cardiovascular: Normal rate and regular rhythm. Pulmonary/Chest: Effort normal and breath sounds normal. No respiratory distress. Abdominal: Soft. Bowel sounds are normal. She exhibits no distension. There is no tenderness. Well healed lap sites    Musculoskeletal: Normal range of motion. She exhibits no edema. Neurological: She is alert and oriented to person, place, and time. Skin: Skin is warm and dry. She is not diaphoretic. Psychiatric: She has a normal mood and affect. ASSESSMENT and PLAN    ICD-10-CM ICD-9-CM    1.  Follow-up examination after abdominal surgery Z09 V67.09      Doing well 4 weeks s/p laparoscopic cholecystectomy   Pathology reviewed   Diet as desired   Activity no restrictions  Ok to add MVI and magnesium if desired   Discharged from surgical care with prn follow up   Karie Maxwell verbalized understanding and questions were answered to the best of my knowledge and ability. Healthy lifestyle  educational materials were provided.

## 2020-10-30 LAB
ANTIBODY SCREEN, EXTERNAL: NEGATIVE
CHLAMYDIA, EXTERNAL: NEGATIVE
HBSAG, EXTERNAL: NEGATIVE
HEPATITIS C AB,   EXT: NEGATIVE
HIV, EXTERNAL: NORMAL
N. GONORRHEA, EXTERNAL: NEGATIVE
RUBELLA, EXTERNAL: NORMAL
T. PALLIDUM, EXTERNAL: NORMAL
TYPE, ABO & RH, EXTERNAL: NORMAL

## 2021-05-07 LAB — GRBS, EXTERNAL: NEGATIVE

## 2021-05-22 ENCOUNTER — HOSPITAL ENCOUNTER (INPATIENT)
Age: 31
LOS: 3 days | Discharge: HOME | End: 2021-05-25
Attending: OBSTETRICS & GYNECOLOGY | Admitting: ADVANCED PRACTICE MIDWIFE
Payer: COMMERCIAL

## 2021-05-22 PROBLEM — Z34.90 PREGNANCY: Status: ACTIVE | Noted: 2021-05-22

## 2021-05-22 LAB
A1 MICROGLOB PLACENTAL VAG QL: POSITIVE
BASOPHILS # BLD: 0 K/UL (ref 0–0.1)
BASOPHILS NFR BLD: 0 % (ref 0–1)
CONTROL LINE PRESENT?: NORMAL
COVID-19 RAPID TEST, COVR: NOT DETECTED
DAILY QC (YES/NO)?: YES
DIFFERENTIAL METHOD BLD: ABNORMAL
EOSINOPHIL # BLD: 0.1 K/UL (ref 0–0.4)
EOSINOPHIL NFR BLD: 1 % (ref 0–7)
ERYTHROCYTE [DISTWIDTH] IN BLOOD BY AUTOMATED COUNT: 13.9 % (ref 11.5–14.5)
EXPIRATION DATE: NORMAL
HCT VFR BLD AUTO: 32 % (ref 35–47)
HGB BLD-MCNC: 10.2 G/DL (ref 11.5–16)
IMM GRANULOCYTES # BLD AUTO: 0.1 K/UL (ref 0–0.04)
IMM GRANULOCYTES NFR BLD AUTO: 1 % (ref 0–0.5)
INTERNAL NEGATIVE CONTROL: NORMAL
KIT LOT NO.: NORMAL
LYMPHOCYTES # BLD: 2.3 K/UL (ref 0.8–3.5)
LYMPHOCYTES NFR BLD: 22 % (ref 12–49)
MCH RBC QN AUTO: 26 PG (ref 26–34)
MCHC RBC AUTO-ENTMCNC: 31.9 G/DL (ref 30–36.5)
MCV RBC AUTO: 81.4 FL (ref 80–99)
MONOCYTES # BLD: 1 K/UL (ref 0–1)
MONOCYTES NFR BLD: 9 % (ref 5–13)
NEUTS SEG # BLD: 6.9 K/UL (ref 1.8–8)
NEUTS SEG NFR BLD: 67 % (ref 32–75)
NRBC # BLD: 0 K/UL (ref 0–0.01)
NRBC BLD-RTO: 0 PER 100 WBC
PH, VAGINAL FLUID: 5 (ref 5–6.1)
PLATELET # BLD AUTO: 291 K/UL (ref 150–400)
PMV BLD AUTO: 10.4 FL (ref 8.9–12.9)
RBC # BLD AUTO: 3.93 M/UL (ref 3.8–5.2)
SARS-COV-2, COV2: NORMAL
SOURCE, COVRS: NORMAL
WBC # BLD AUTO: 10.3 K/UL (ref 3.6–11)

## 2021-05-22 PROCEDURE — 85025 COMPLETE CBC W/AUTO DIFF WBC: CPT

## 2021-05-22 PROCEDURE — 99285 EMERGENCY DEPT VISIT HI MDM: CPT

## 2021-05-22 PROCEDURE — 84112 EVAL AMNIOTIC FLUID PROTEIN: CPT | Performed by: ADVANCED PRACTICE MIDWIFE

## 2021-05-22 PROCEDURE — 65270000029 HC RM PRIVATE

## 2021-05-22 PROCEDURE — 87635 SARS-COV-2 COVID-19 AMP PRB: CPT

## 2021-05-22 PROCEDURE — 74011250637 HC RX REV CODE- 250/637: Performed by: ADVANCED PRACTICE MIDWIFE

## 2021-05-22 PROCEDURE — 36415 COLL VENOUS BLD VENIPUNCTURE: CPT

## 2021-05-22 PROCEDURE — 83986 ASSAY PH BODY FLUID NOS: CPT | Performed by: ADVANCED PRACTICE MIDWIFE

## 2021-05-22 RX ORDER — SODIUM CHLORIDE, SODIUM LACTATE, POTASSIUM CHLORIDE, CALCIUM CHLORIDE 600; 310; 30; 20 MG/100ML; MG/100ML; MG/100ML; MG/100ML
125 INJECTION, SOLUTION INTRAVENOUS CONTINUOUS
Status: DISCONTINUED | OUTPATIENT
Start: 2021-05-22 | End: 2021-05-23

## 2021-05-22 RX ORDER — MAG HYDROX/ALUMINUM HYD/SIMETH 200-200-20
30 SUSPENSION, ORAL (FINAL DOSE FORM) ORAL
Status: DISCONTINUED | OUTPATIENT
Start: 2021-05-22 | End: 2021-05-23

## 2021-05-22 RX ORDER — NALOXONE HYDROCHLORIDE 0.4 MG/ML
0.4 INJECTION, SOLUTION INTRAMUSCULAR; INTRAVENOUS; SUBCUTANEOUS AS NEEDED
Status: DISCONTINUED | OUTPATIENT
Start: 2021-05-22 | End: 2021-05-23

## 2021-05-22 RX ORDER — OXYTOCIN/RINGER'S LACTATE 30/500 ML
10 PLASTIC BAG, INJECTION (ML) INTRAVENOUS AS NEEDED
Status: COMPLETED | OUTPATIENT
Start: 2021-05-22 | End: 2021-05-23

## 2021-05-22 RX ORDER — ZOLPIDEM TARTRATE 5 MG/1
5 TABLET ORAL
Status: DISCONTINUED | OUTPATIENT
Start: 2021-05-22 | End: 2021-05-23

## 2021-05-22 RX ORDER — BUTORPHANOL TARTRATE 1 MG/ML
2 INJECTION INTRAMUSCULAR; INTRAVENOUS
Status: DISCONTINUED | OUTPATIENT
Start: 2021-05-22 | End: 2021-05-23

## 2021-05-22 RX ORDER — OXYTOCIN/RINGER'S LACTATE 30/500 ML
87.3 PLASTIC BAG, INJECTION (ML) INTRAVENOUS AS NEEDED
Status: COMPLETED | OUTPATIENT
Start: 2021-05-22 | End: 2021-05-23

## 2021-05-22 RX ORDER — SODIUM CHLORIDE 0.9 % (FLUSH) 0.9 %
5-40 SYRINGE (ML) INJECTION EVERY 8 HOURS
Status: DISCONTINUED | OUTPATIENT
Start: 2021-05-22 | End: 2021-05-23

## 2021-05-22 RX ORDER — SODIUM CHLORIDE 0.9 % (FLUSH) 0.9 %
5-40 SYRINGE (ML) INJECTION AS NEEDED
Status: DISCONTINUED | OUTPATIENT
Start: 2021-05-22 | End: 2021-05-23

## 2021-05-22 RX ADMIN — MISOPROSTOL 50 MCG: 100 TABLET ORAL at 21:08

## 2021-05-22 NOTE — ED PROVIDER NOTES
Brayden Rodrigues is a 34 yo  at 38w3d with an JUMANA of 21. She presents to the SLO[facility] for possible PROM at 0500. Reports she wok up this morning to wet underwear and has felt a couple small gushes of clear fluid since that time, enough to soak two pads. Reports some like pink discharge this afternoon. Pt reports occasional ctx, but not painful. Endorses good fetal movement. Prenatal care has been received at [facility] with Dr. Trevor Ny. Pregnancy has been uncomplicated. No past medical history on file. Past Surgical History:   Procedure Laterality Date    HX LAP CHOLECYSTECTOMY  2019         Family History:   Problem Relation Age of Onset    Diabetes Father     Cancer Paternal Uncle        Social History     Socioeconomic History    Marital status:      Spouse name: Not on file    Number of children: Not on file    Years of education: Not on file    Highest education level: Not on file   Occupational History    Not on file   Tobacco Use    Smoking status: Never Smoker    Smokeless tobacco: Never Used   Substance and Sexual Activity    Alcohol use: Yes     Comment: occassional    Drug use: Never    Sexual activity: Not on file   Other Topics Concern    Not on file   Social History Narrative    Not on file     Social Determinants of Health     Financial Resource Strain:     Difficulty of Paying Living Expenses:    Food Insecurity:     Worried About Running Out of Food in the Last Year:     [address] in the Last Year:    Transportation Needs:     Lack of Transportation (Medical):      Lack of Transportation (Non-Medical):    Physical Activity:     Days of Exercise per Week:     Minutes of Exercise per Session:    Stress:     Feeling of Stress :    Social Connections:     Frequency of Communication with Friends and Family:     Frequency of Social Gatherings with Friends and Family:     Attends [employer] Services:     Active Member of Clubs or Organizations:     Attends Club or Organization Meetings:     Marital Status:    Intimate Partner Violence:     Fear of Current or Ex-Partner:     Emotionally Abused:     Physically Abused:     Sexually Abused: ALLERGIES: Patient has no known allergies. Review of Systems   Constitutional: Negative. HENT: Negative. Eyes: Negative. Respiratory: Negative. Cardiovascular: Negative. Gastrointestinal: Negative. Endocrine: Negative. Genitourinary: Positive for vaginal discharge. Musculoskeletal: Negative. Skin: Negative. Allergic/Immunologic: Negative. Neurological: Negative. Hematological: Negative. Psychiatric/Behavioral: Negative. Vitals:    05/22/21 1916   BP: 115/64   Pulse: 73   Resp: 16   Temp: 98.8 °F (37.1 °C)   Weight: 85.7 kg (189 lb)   Height: 5' 3\" (1.6 m)            Physical Exam  Vitals and nursing note reviewed. Exam conducted with a chaperone present. Constitutional:       Appearance: Normal appearance. She is obese. HENT:      Head: Normocephalic and atraumatic. Nose: Nose normal.      Mouth/Throat:      Mouth: Mucous membranes are moist.      Pharynx: Oropharynx is clear. Eyes:      Extraocular Movements: Extraocular movements intact. Cardiovascular:      Rate and Rhythm: Normal rate and regular rhythm. Pulses: Normal pulses. Heart sounds: Normal heart sounds. Pulmonary:      Effort: Pulmonary effort is normal.      Breath sounds: Normal breath sounds. Abdominal:      Palpations: Abdomen is soft. Comments: Gravid, ctx palpate mild, resting tone soft   Genitourinary:     Comments: SVE: 1/50/-2, vertex, ruptured    Nitrazine neg  Amnisure positive  Musculoskeletal:         General: Normal range of motion. Cervical back: Normal range of motion and neck supple. Skin:     General: Skin is warm and dry. Capillary Refill: Capillary refill takes less than 2 seconds. Neurological:      General: No focal deficit present. Mental Status: She is alert and oriented to person, place, and time. Mental status is at baseline. Psychiatric:         Mood and Affect: Mood normal.         Behavior: Behavior normal.         Thought Content:  Thought content normal.         Judgment: Judgment normal.      NST: Monitored for 20 minutes, reactive, cat 1, baseline 155, positive accels, no decels, moderate variability, ctx q 7-8 min, mild to palpation, resting tone soft    Patient Vitals for the past 4 hrs:   Temp Pulse Resp BP   05/22/21 1916 98.8 °F (37.1 °C) 73 16 115/64         MDM  Number of Diagnoses or Management Options     Amount and/or Complexity of Data Reviewed  Decide to obtain previous medical records or to obtain history from someone other than the patient: yes  Review and summarize past medical records: yes  Independent visualization of images, tracings, or specimens: yes    Risk of Complications, Morbidity, and/or Mortality  Presenting problems: moderate  Diagnostic procedures: moderate  Management options: moderate    Critical Care  Total time providing critical care: < 30 minutes    Patient Progress  Patient progress: stable    ED Course as of May 22 1943   Sat May 22, 2021   1941 Admit to SLOAN  NST  Nitrazine neg  Amnisure positive  SVE: 1/50/-2, vertex  Admit to l and d for cytotec and labor/srom management    [LA]      ED Course User Index  [LA] Yara Swift CNM

## 2021-05-22 NOTE — PROGRESS NOTES
1907 Patient received to SLOAN # 3 under services of Dr. Arsen Goldberg complaining of leaking fluid from her vagina onset 0500 today. States she having irregular  Contractions  1912 External fetal monitor applied  1920 Nitrazine to perineum negative  1923 ESTEFANY Wyatt notified of patient's arrival to [facility] ESTEFANY Bermudez Likes at bedside, viewing FHR tracing  1930 Amnisure positive  1933 SVE by Maricel Guzman CNM 1/50/-2  1935 Bedside and Verbal shift change report given to DALIA Jimenes RN   by [facility]. Report included the following information SBAR and Recent Results.

## 2021-05-23 ENCOUNTER — ANESTHESIA (OUTPATIENT)
Dept: LABOR AND DELIVERY | Age: 31
End: 2021-05-23
Payer: COMMERCIAL

## 2021-05-23 ENCOUNTER — ANESTHESIA EVENT (OUTPATIENT)
Dept: LABOR AND DELIVERY | Age: 31
End: 2021-05-23
Payer: COMMERCIAL

## 2021-05-23 PROCEDURE — 0KQM0ZZ REPAIR PERINEUM MUSCLE, OPEN APPROACH: ICD-10-PCS | Performed by: OBSTETRICS & GYNECOLOGY

## 2021-05-23 PROCEDURE — 77030031139 HC SUT VCRL2 J&J -A

## 2021-05-23 PROCEDURE — A4300 CATH IMPL VASC ACCESS PORTAL: HCPCS

## 2021-05-23 PROCEDURE — 76060000078 HC EPIDURAL ANESTHESIA

## 2021-05-23 PROCEDURE — 74011000250 HC RX REV CODE- 250: Performed by: ANESTHESIOLOGY

## 2021-05-23 PROCEDURE — 74011000250 HC RX REV CODE- 250

## 2021-05-23 PROCEDURE — 77030019905 HC CATH URETH INTMIT MDII -A

## 2021-05-23 PROCEDURE — 75410000003 HC RECOV DEL/VAG/CSECN EA 0.5 HR

## 2021-05-23 PROCEDURE — 75410000002 HC LABOR FEE PER 1 HR

## 2021-05-23 PROCEDURE — 74011000258 HC RX REV CODE- 258: Performed by: ADVANCED PRACTICE MIDWIFE

## 2021-05-23 PROCEDURE — 74011250637 HC RX REV CODE- 250/637: Performed by: ADVANCED PRACTICE MIDWIFE

## 2021-05-23 PROCEDURE — 74011250636 HC RX REV CODE- 250/636: Performed by: ADVANCED PRACTICE MIDWIFE

## 2021-05-23 PROCEDURE — 00HU33Z INSERTION OF INFUSION DEVICE INTO SPINAL CANAL, PERCUTANEOUS APPROACH: ICD-10-PCS | Performed by: ANESTHESIOLOGY

## 2021-05-23 PROCEDURE — 75410000000 HC DELIVERY VAGINAL/SINGLE

## 2021-05-23 PROCEDURE — 65410000002 HC RM PRIVATE OB

## 2021-05-23 RX ORDER — NALOXONE HYDROCHLORIDE 0.4 MG/ML
0.4 INJECTION, SOLUTION INTRAMUSCULAR; INTRAVENOUS; SUBCUTANEOUS AS NEEDED
Status: DISCONTINUED | OUTPATIENT
Start: 2021-05-23 | End: 2021-05-23

## 2021-05-23 RX ORDER — EPHEDRINE SULFATE/0.9% NACL/PF 50 MG/5 ML
12.5 SYRINGE (ML) INTRAVENOUS
Status: DISCONTINUED | OUTPATIENT
Start: 2021-05-23 | End: 2021-05-23

## 2021-05-23 RX ORDER — FENTANYL/BUPIVACAINE/NS/PF 2-1250MCG
PREFILLED PUMP RESERVOIR EPIDURAL
Status: COMPLETED
Start: 2021-05-23 | End: 2021-05-23

## 2021-05-23 RX ORDER — NALOXONE HYDROCHLORIDE 0.4 MG/ML
0.4 INJECTION, SOLUTION INTRAMUSCULAR; INTRAVENOUS; SUBCUTANEOUS AS NEEDED
Status: DISCONTINUED | OUTPATIENT
Start: 2021-05-23 | End: 2021-05-25 | Stop reason: HOSPADM

## 2021-05-23 RX ORDER — BUPIVACAINE HYDROCHLORIDE 5 MG/ML
INJECTION, SOLUTION EPIDURAL; INTRACAUDAL
Status: COMPLETED | OUTPATIENT
Start: 2021-05-23 | End: 2021-05-23

## 2021-05-23 RX ORDER — OXYTOCIN/RINGER'S LACTATE 30/500 ML
10 PLASTIC BAG, INJECTION (ML) INTRAVENOUS AS NEEDED
Status: DISCONTINUED | OUTPATIENT
Start: 2021-05-23 | End: 2021-05-25 | Stop reason: HOSPADM

## 2021-05-23 RX ORDER — OXYCODONE AND ACETAMINOPHEN 5; 325 MG/1; MG/1
1 TABLET ORAL
Status: DISCONTINUED | OUTPATIENT
Start: 2021-05-23 | End: 2021-05-25 | Stop reason: HOSPADM

## 2021-05-23 RX ORDER — ACETAMINOPHEN 325 MG/1
650 TABLET ORAL
Status: DISCONTINUED | OUTPATIENT
Start: 2021-05-23 | End: 2021-05-25 | Stop reason: HOSPADM

## 2021-05-23 RX ORDER — OXYTOCIN/RINGER'S LACTATE 30/500 ML
1-25 PLASTIC BAG, INJECTION (ML) INTRAVENOUS
Status: DISCONTINUED | OUTPATIENT
Start: 2021-05-23 | End: 2021-05-23

## 2021-05-23 RX ORDER — IBUPROFEN 600 MG/1
600 TABLET ORAL
Status: DISCONTINUED | OUTPATIENT
Start: 2021-05-23 | End: 2021-05-25 | Stop reason: HOSPADM

## 2021-05-23 RX ORDER — OXYTOCIN/RINGER'S LACTATE 30/500 ML
87.3 PLASTIC BAG, INJECTION (ML) INTRAVENOUS AS NEEDED
Status: DISCONTINUED | OUTPATIENT
Start: 2021-05-23 | End: 2021-05-25 | Stop reason: HOSPADM

## 2021-05-23 RX ORDER — EPHEDRINE SULFATE/0.9% NACL/PF 50 MG/5 ML
SYRINGE (ML) INTRAVENOUS
Status: DISCONTINUED
Start: 2021-05-23 | End: 2021-05-23 | Stop reason: WASHOUT

## 2021-05-23 RX ORDER — FENTANYL/BUPIVACAINE/NS/PF 2-1250MCG
1-16 PREFILLED PUMP RESERVOIR EPIDURAL CONTINUOUS
Status: DISCONTINUED | OUTPATIENT
Start: 2021-05-23 | End: 2021-05-23

## 2021-05-23 RX ORDER — BUPIVACAINE HYDROCHLORIDE 5 MG/ML
30 INJECTION, SOLUTION EPIDURAL; INTRACAUDAL AS NEEDED
Status: DISCONTINUED | OUTPATIENT
Start: 2021-05-23 | End: 2021-05-23

## 2021-05-23 RX ORDER — SWAB
1 SWAB, NON-MEDICATED MISCELLANEOUS DAILY
Status: DISCONTINUED | OUTPATIENT
Start: 2021-05-24 | End: 2021-05-25 | Stop reason: HOSPADM

## 2021-05-23 RX ORDER — HYDROCORTISONE ACETATE PRAMOXINE HCL 2.5; 1 G/100G; G/100G
CREAM TOPICAL AS NEEDED
Status: DISCONTINUED | OUTPATIENT
Start: 2021-05-23 | End: 2021-05-25 | Stop reason: HOSPADM

## 2021-05-23 RX ORDER — DOCUSATE SODIUM 100 MG/1
100 CAPSULE, LIQUID FILLED ORAL
Status: DISCONTINUED | OUTPATIENT
Start: 2021-05-23 | End: 2021-05-25 | Stop reason: HOSPADM

## 2021-05-23 RX ADMIN — OXYTOCIN 10000 MILLI-UNITS: 10 INJECTION INTRAVENOUS at 14:41

## 2021-05-23 RX ADMIN — Medication 10 ML/HR: at 09:52

## 2021-05-23 RX ADMIN — OXYTOCIN 87.3 MILLI-UNITS/MIN: 10 INJECTION INTRAVENOUS at 14:52

## 2021-05-23 RX ADMIN — SODIUM CHLORIDE, POTASSIUM CHLORIDE, SODIUM LACTATE AND CALCIUM CHLORIDE 125 ML/HR: 600; 310; 30; 20 INJECTION, SOLUTION INTRAVENOUS at 01:36

## 2021-05-23 RX ADMIN — SODIUM CHLORIDE, POTASSIUM CHLORIDE, SODIUM LACTATE AND CALCIUM CHLORIDE 125 ML/HR: 600; 310; 30; 20 INJECTION, SOLUTION INTRAVENOUS at 09:18

## 2021-05-23 RX ADMIN — BUPIVACAINE HYDROCHLORIDE 5 MG: 5 INJECTION, SOLUTION EPIDURAL; INTRACAUDAL; PERINEURAL at 09:35

## 2021-05-23 RX ADMIN — IBUPROFEN 600 MG: 600 TABLET, FILM COATED ORAL at 22:34

## 2021-05-23 RX ADMIN — BUTORPHANOL TARTRATE 2 MG: 1 INJECTION, SOLUTION INTRAMUSCULAR; INTRAVENOUS at 05:14

## 2021-05-23 RX ADMIN — PROMETHAZINE HYDROCHLORIDE 25 MG: 25 INJECTION INTRAMUSCULAR; INTRAVENOUS at 05:13

## 2021-05-23 RX ADMIN — MISOPROSTOL 25 MCG: 100 TABLET ORAL at 01:33

## 2021-05-23 RX ADMIN — OXYTOCIN 1 MILLI-UNITS/MIN: 10 INJECTION INTRAVENOUS at 07:09

## 2021-05-23 RX ADMIN — IBUPROFEN 600 MG: 600 TABLET, FILM COATED ORAL at 17:10

## 2021-05-23 NOTE — ANESTHESIA PROCEDURE NOTES
Epidural Block    Patient location during procedure: OB  Start time: 5/23/2021 9:35 AM  End time: 5/23/2021 9:46 AM  Reason for block: labor epidural  Staffing  Performed: attending   Anesthesiologist: Jhoana Cervantes MD  Preanesthetic Checklist  Completed: patient identified, IV checked, site marked, risks and benefits discussed, surgical consent, monitors and equipment checked, pre-op evaluation and timeout performed  Block Placement  Patient position: sitting  Prep: ChloraPrep  Sterility prep: cap, drape, gloves and mask  Sedation level: no sedation  Patient monitoring: continuous pulse oximetry and heart rate  Approach: midline  Location: lumbar  Lumbar location: L4-L5  Epidural  Loss of resistance technique: saline  Guidance: landmark technique  Needle  Needle type: Tuohy   Needle gauge: 17 G  Needle length: 9 cm  Needle insertion depth: 7 cm  Catheter type: end hole  Catheter size: 19 G  Catheter at skin depth: 12 cm  Catheter securement method: clear occlusive dressing, liquid medical adhesive and surgical tape  Medications Administered  Bupivacaine (PF) (MARCAINE) 0.5 % (5 mg/mL) Epidural, 5 mg  Assessment  Sensory level: T6  Block outcome: pain improved  Number of attempts: 1

## 2021-05-23 NOTE — PROGRESS NOTES
Labor Progress Note    S: Patient seen, fetal heart rate and contraction pattern evaluated. Feeling contractions about q5 minutes, resting in between. Partner at bedside.       Physical Exam:  Patient Vitals for the past 4 hrs:   Temp Pulse Resp BP   21 0807 98 °F (36.7 °C) 68 18 123/81         Cervical Exam: deferred  Membranes:  Continued clear fluid  Uterine Contractions:  Frequency: Irregular q1-7 minutes, mild, pit at 3  Fetal Heart Rate: 145s, no accels, non repetitive variables, minimal variability with periods of moderate variability s/p stadol/phenergan      Assessment/Plan:    35 y.o.  at 38w4d IUP  PROM, now latent labor s/p cytotec now with pitocin augmentation  Cat 2 tracing  GBS negative    P:  Assumed care of patient  Introduced self to patient/partner  Continue present management, increase pitocin per protocol  Recheck with maternal/fetal indication or 4-6 hours post q2-3 minute contractions  All questions asked/answered and patient/partner agree with plan    Isael Thomas CNM

## 2021-05-23 NOTE — PROGRESS NOTES
Bedside and Verbal shift change report given to ENRIQUE Feliz RN (oncoming nurse) by JOSE GUADALUPE Fontenot RN (offgoing nurse). Report included the following information SBAR, Kardex, Intake/Output, MAR and Recent Results. Pt sleeping intermittently. 9398:  HERB Serrano at bedside assessing pt. Orders received that pt may have epidural when desired. Viewed FHR strip, aware of periods of minimal FHR variability and confirmed IV bolus and O2 via facemask. See MAR.  0950:  Called Dr. Heladio Nicole to place epidural.    3951:  Dr. Heladio Nicole at bedside to place epidural, pt on L side. 3843:  Epidural cath placed and dosed per Dr. Heladio Nicole, see MAR.  1150:  Notified Harry Frye CNM of late decles and requested she assess pt.  1151:  Harry Frye CNM at bedside assessing pt, viewed FHR strip. 1153:  Cervical exam per Harry Frye CNM:  10/100/+2, to begin pushing. 1157:  RN at bedside, continuously monitoring FHR tracing while pt pushes with UC's. 1228: Attempted to restart Pitocin, see MAR.  1232:  Noted late decels, Pitocin off, see MAR.  8278:  Harry Frye CNM at bedside, viewed FHR strip and orders received to restart Pitocin. See MAR.  3137: In knee chest position, pushing with UC's.  1347:  Lilia COLLAZOM at bedside to assess pt's progress. 1349:  Noted FHR accel with scalp stim per Loi Gomes CNM with cervical exam.  Noted pt's progress in moving infant. 1400:  Mauricio Sierra assessed pt's progress with pushing, viewed FHR strip and states she will have Dr. iLzeth Diaz assess FHR strip for continued pushing. Pt turned to L side in between pushing. 1725:  Transferred to [facility] via wheelchair accompanied by ENRIQUE Feliz RN and . Pt holding infant on transfer  1740:TRANSFER - OUT REPORT:    Verbal report given to DOMINIC Velásquez RN(name) on Elizabeth  being transferred to [place] [address](unit) for routine progression of care       Report consisted of patients Situation, Background, Assessment and   Recommendations(SBAR).      Information from the following report(s) SBAR, Kardex, Intake/Output, MAR and Recent Results was reviewed with the receiving nurse. Lines:   Peripheral IV 05/22/21 Left;Posterior Forearm (Active)        Opportunity for questions and clarification was provided.       Patient transported with:   Registered Nurse

## 2021-05-23 NOTE — ROUTINE PROCESS
TRANSFER - IN REPORT:    Verbal report received from Josue Devine RN(name) on Brit Rad  being received from L&D(unit) for routine progression of care      Report consisted of patients Situation, Background, Assessment and   Recommendations(SBAR). Information from the following report(s) SBAR was reviewed with the receiving nurse. Opportunity for questions and clarification was provided. Assessment completed upon patients arrival to unit and care assumed.

## 2021-05-23 NOTE — ANESTHESIA POSTPROCEDURE EVALUATION
* No procedures listed *.    epidural    Anesthesia Post Evaluation        Patient location during evaluation: PACU  Patient participation: complete - patient participated  Level of consciousness: awake and alert  Pain management: adequate  Airway patency: patent  Anesthetic complications: no  Cardiovascular status: acceptable  Respiratory status: acceptable  Hydration status: acceptable  Comments: I have seen and evaluated the patient and is ready for discharge.  Hernán Glover MD    Post anesthesia nausea and vomiting:  none      INITIAL Post-op Vital signs:   Vitals Value Taken Time   /63 05/23/21 1544   Temp     Pulse 92 05/23/21 1544   Resp 18 05/23/21 1544   SpO2

## 2021-05-23 NOTE — PROGRESS NOTES
Labor Progress Note    S: Called by RN as patient having non repetitive lates s/p epidural. Pitocin has been turned off, positioned changed, fluid bolus and has O2 on. Patient seen, fetal heart rate and contraction pattern evaluated.  Comfortable with epidural.     Physical Exam:  Patient Vitals for the past 4 hrs:   Temp Pulse Resp BP SpO2   21 1059 -- -- -- -- 100 %   21 1058 -- 68 18 (!) 107/56 100 %   21 1054 -- -- -- -- 100 %   21 1051 -- 61 -- 118/64 --   21 1039 -- -- -- -- 100 %   21 1035 -- 64 -- 111/77 --   21 1020 -- 65 18 111/77 --   21 1019 -- -- -- -- 100 %   21 1005 99 °F (37.2 °C) 64 18 118/68 100 %   21 1004 -- -- -- -- 100 %   21 0949 -- 70 18 121/70 100 %   21 0944 -- 73 -- -- 100 %   21 0943 -- 66 -- 112/61 --   21 0939 -- 72 -- 134/78 100 %   21 0936 -- 75 18 114/65 --   21 0934 -- -- -- -- 100 %   21 0909 -- -- -- -- 100 %   21 0908 -- 67 18 (!) 149/86 100 %   21 0807 98 °F (36.7 °C) 68 18 123/81 --         Cervical Exam: c/c/2  Membranes:  clear  Uterine Contractions:  Frequency: Irregular  Fetal Heart Rate: 150s, +accels, non repetitive late decels, moderate variability, +scalp stim      Assessment/Plan:    35 y.o.  at 38w4d IUP  2nd stage labor  Cat 2 tracing  GBS negative    P:  Reviewed FHR decels and interventions with patient/partner   Will start pushing  All questions asked/answered and patient/partner agree with plan     Nae Juárez CNM

## 2021-05-23 NOTE — PROGRESS NOTES
[facility] Labor Progress Note     Patient: Luis Pickering MRN: [id number]  SSN: [id number]    YOB: 1990  Age: 35 y.o. Sex: female        Subjective:   Patient resting after pain medication.  remains at bedside providing support. Objective:   Blood pressure (!) 95/55, pulse 66, temperature 98.3 °F (36.8 °C), resp. rate 16, height 5' 3\" (1.6 m), weight 85.7 kg (189 lb), not currently breastfeeding. Patient Vitals for the past 4 hrs:    Mode Fetal Heart Rate Variability Accelerations RN Reviewed Strip?   21 0550 External 150 6-25 BPM No Yes     Uterine contractions irregular, mild to palpation, resting tone soft    Sterile Vaginal Exam (Per RN): 3 cm dilated/ 70 % effaced/ 0 station, fetal presentation vertex, membranes ruptured for continued clear fluid    S/P cytotec x 2    Assessment:     38w4d  Category 1 fetal heart rate tracing   ROM x 26 hours    Plan:   Stop cytotec (cerix ripened)  Proceed with pitocin for further augmentation as ctx very irregular and spaced  Epidural PRN for pain control  Recheck cervix with maternal or fetal indication  Maternal and fetal monitoring per protocol  Anticipate     Elvira Roland CNM

## 2021-05-23 NOTE — L&D DELIVERY NOTE
Delivery Summary    Patient: Yuliya Longoria MRN: [id number]  SSN: [id number]    YOB: 1990  Age: 35 y.o. Sex: female       Information for the patient's :  Panfilo Gomez [[id number]]       Labor Events:    Labor: No    Steroids:     Cervical Ripening Date/Time: 2021 9:08 PM   Cervical Ripening Type: Misoprostol   Antibiotics During Labor:     Rupture Identifier: Sac 1    Rupture Date/Time: 2021 5:00 AM   Rupture Type: SROM   Amniotic Fluid Volume: Moderate    Amniotic Fluid Description: Clear    Amniotic Fluid Odor: None    Induction: None       Induction Date/Time:        Indications for Induction:      Augmentation: Oxytocin;Misoprostol   Augmentation Date/Time: 19:08 PM   Indications for Augmentation: Ineffective Contraction Pattern   Labor complications: None       Additional complications:        Delivery Events:  Indications For Episiotomy:     Episiotomy: None   Perineal Laceration(s): 2nd   Repaired: Yes   Periurethral Laceration Location:      Repaired:     Labial Laceration Location: left   Repaired: Yes   Sulcal Laceration Location:     Repaired:     Vaginal Laceration Location:     Repaired:     Cervical Laceration Location:     Repaired:     Repair Suture: Vicryl 3-0   Number of Repair Packets: 2   Estimated Blood Loss (ml):  ml   Quantitative Blood Loss (ml)                Delivery Date: 2021    Delivery Time: 2:32 PM  Delivery Type: Vaginal, Spontaneous  Sex:  Female    Gestational Age: 38w3d   Delivery Clinician:  Nely Ortega  Living Status: Living   Delivery Location: L&D room 11          APGARS  One minute Five minutes Ten minutes   Skin color: 1   2        Heart rate: 2   2        Grimace: 2   2        Muscle tone: 2   2        Breathin   2        Totals: 9   10            Presentation: Vertex    Position:   Occiput Posterior  Resuscitation Method:  Suctioning-bulb; Tactile Stimulation     Meconium Stained: None Cord Information: 3 Vessels  Complications: Nuchal Cord Without Compressions  Cord around: head  Delayed cord clamping? Yes  Cord clamped date/time:2021  2:35 PM  Disposition of Cord Blood: Lab    Blood Gases Sent?: No    Placenta:  Date/Time: 2021  2:41 PM  Removal: Spontaneous      Appearance: Normal;Intact     [place] Measurements:  Birth Weight:        Birth Length:        Head Circumference:        Chest Circumference:       Abdominal Girth: Other Providers:   Tavo Coleman STEPHANIE A, Primary Nurse;Primary  Nurse;Midwife           Group B Strep:   Lab Results   Component Value Date/Time    GrBStrep, External negative 2021 12:00 AM     Information for the patient's :  Avis Mark [[id number]]     Lab Results   Component Value Date/Time    ABO/Rh(D) O POSITIVE 2021 02:46 PM    VAN IgG NEG 2021 02:46 PM    Bilirubin if VAN pos: IF DIRECT MORAIMA POSITIVE, BILIRUBIN TO FOLLOW 2021 02:46 PM      No results for input(s): PCO2CB, PO2CB, HCO3I, SO2I, IBD, PTEMPI, SPECTI, PHICB, ISITE, IDEV, IALLEN in the last 72 hours. Called to patient's room for delivery. Patient was c/c/2 at 1153 and began pushing at 1157. She pushed for approximately 2.5 hours to TSVD of VFI in direct OP postioin at 1432. Loose nuchal x1 noted and easily reduced. Shoulders and body delivered with ease, infant lifted to maternal abdomen, towel dried, vigorous cry. Cord double clamped by me and then cut by FOB after pulsations ceased. Apgars 9 at one minute and 10 at five minutes. Placenta spontaneous at 1441. Examined and was found to be intact. 3 vessel cord. Fundus firm to massage. Pitocin IV per protocol. QBL per flowsheet. Vulva, vagina and perineum inspected and found to have 2nd degree perineal and left labial laceration. Repaired with 3-0 Vicryl CT x2 under Epidural anesthesia to good approximation and hemostasis. Mom and baby doing well. Baby remains skin to skin with mom.

## 2021-05-23 NOTE — PROGRESS NOTES
0010 - Interventions for decels:    Fluid bolus has started giving 500ml. Had patient turn to right side    Patients states contractions feel crampy like period cramps.

## 2021-05-23 NOTE — H&P
Inpatient History and Physical    Patient: Logan Paredes MRN: [id number]  SSN: [id number]    YOB: 1990  Age: 35 y.o. Sex: female      Subjective:      Logan Paredes is a 34 yo  at 38w3d with an JUMANA of 21. She presents to labor and delivery from the [facility] for possible PROM at 0500. Reports she wok up this morning to wet underwear and has felt a couple small gushes of clear fluid since that time, enough to soak two pads. Reports some like pink discharge this afternoon. Pt reports occasional ctx, but not painful. Endorses good fetal movement. In SLOAN rupture was confirmed with amnisure and pt admitted to l and d for management.      Prenatal care has been received at [facility] with Dr. Cathleen Trivedi. Pregnancy has been uncomplicated. History reviewed. No pertinent past medical history. Past Surgical History:   Procedure Laterality Date    HX LAP CHOLECYSTECTOMY  2019    HX OTHER SURGICAL  2018    Gall Bladder    HX OTHER SURGICAL  2010    wisdom teeth      Family History   Problem Relation Age of Onset    Diabetes Father     Hypertension Father     Depression Father     Cancer Paternal Uncle     No Known Problems Mother      Social History     Tobacco Use    Smoking status: Never Smoker    Smokeless tobacco: Never Used   Substance Use Topics    Alcohol use: Not Currently     Comment: occassional      Prior to Admission medications    Medication Sig Start Date End Date Taking? Authorizing Provider   prenatal vit calc,iron,folic (PRENATAL VITAMIN PO) Take  by mouth. Yes Provider, Historical   APRI 0.15-0.03 mg tab TAKE 1 TABLET BY MOUTH EVERY DAY 3/9/19   Provider, Historical        No Known Allergies    Review of Systems   Constitutional: Negative. HENT: Negative. Eyes: Negative. Respiratory: Negative. Cardiovascular: Negative. Gastrointestinal: Negative. Endocrine: Negative. Genitourinary: Positive for vaginal discharge.    Musculoskeletal: Negative. Skin: Negative. Allergic/Immunologic: Negative. Neurological: Negative. Hematological: Negative. Psychiatric/Behavioral: Negative. Objective:     Vitals:    05/22/21 1916   BP: 115/64   Pulse: 73   Resp: 16   Temp: 98.8 °F (37.1 °C)   Weight: 85.7 kg (189 lb)   Height: 5' 3\" (1.6 m)        Physical Exam  Vitals and nursing note reviewed. Exam conducted with a chaperone present. Constitutional:       Appearance: Normal appearance. She is obese. HENT:      Head: Normocephalic and atraumatic. Nose: Nose normal.      Mouth/Throat:      Mouth: Mucous membranes are moist.      Pharynx: Oropharynx is clear. Eyes:      Extraocular Movements: Extraocular movements intact. Cardiovascular:      Rate and Rhythm: Normal rate and regular rhythm. Pulses: Normal pulses. Heart sounds: Normal heart sounds. Pulmonary:      Effort: Pulmonary effort is normal.      Breath sounds: Normal breath sounds. Abdominal:      Palpations: Abdomen is soft. Comments: Gravid, ctx palpate mild, resting tone soft   Genitourinary:     Comments: SVE: 1/50/-2, vertex, ruptured    Nitrazine neg  Amnisure positive  Musculoskeletal:         General: Normal range of motion. Cervical back: Normal range of motion and neck supple. Skin:     General: Skin is warm and dry. Capillary Refill: Capillary refill takes less than 2 seconds. Neurological:      General: No focal deficit present. Mental Status: She is alert and oriented to person, place, and time. Mental status is at baseline. Psychiatric:         Mood and Affect: Mood normal.         Behavior: Behavior normal.         Thought Content:  Thought content normal.         Judgment: Judgment normal.       NST: Monitored for 20 minutes, reactive, cat 1, baseline 155, positive accels, no decels, moderate variability, ctx q 7-8 min, mild to palpation, resting tone soft     Patient Vitals for the past 4 hrs:    Temp Pulse Resp BP 05/22/21 1916 98.8 °F (37.1 °C) 73 16 115/64         Assessment:     Hospital Problems  Date Reviewed: 4/2/2019        Codes Class Noted POA    Pregnancy ICD-10-CM: Z34.90  ICD-9-CM: V22.2  5/22/2021 Unknown            ROM x 15 hours  O Positive  Rubella Immune  Hep B and HIV Neg  GBS Neg   COVID Unknown    Plan:     Admit to l and d  IV, CBC, STBB  COVID Screening  Cytotec for cervical ripening, 50mcg intravaginally for first dose, then 25mcg buccal for up to 5 doses after. Recheck cervix if consistent ctxs and/or after multiple doses to see if ripened, if ripened switch to pitocin.    Maternal and fetal monitoring per protocol  Anticipate vaginal delivery    Signed By: Hodan Martin CNM     May 22, 2021

## 2021-05-24 PROCEDURE — 74011250637 HC RX REV CODE- 250/637: Performed by: ADVANCED PRACTICE MIDWIFE

## 2021-05-24 PROCEDURE — 65410000002 HC RM PRIVATE OB

## 2021-05-24 RX ORDER — IBUPROFEN 600 MG/1
800 TABLET ORAL
Qty: 40 TABLET | Refills: 1 | Status: SHIPPED | OUTPATIENT
Start: 2021-05-24

## 2021-05-24 RX ADMIN — IBUPROFEN 600 MG: 600 TABLET, FILM COATED ORAL at 18:42

## 2021-05-24 RX ADMIN — IBUPROFEN 600 MG: 600 TABLET, FILM COATED ORAL at 05:02

## 2021-05-24 RX ADMIN — IBUPROFEN 600 MG: 600 TABLET, FILM COATED ORAL at 11:56

## 2021-05-24 NOTE — PROGRESS NOTES
Post-Partum Day Number 1 Progress Note    Claudia Thompson     Assessment:   Doing well, post partum day 1    Plan:  1. Continue routine postpartum and perineal care as well as maternal education. 2. Am d/c in    Information for the patient's :  Esteban Valentin [[id number]]   Vaginal, Spontaneous    Patient doing well without significant complaint. Voiding without difficulty, normal lochia. Vitals:  Visit Vitals  /70 (BP 1 Location: Right upper arm, BP Patient Position: At rest)   Pulse 71   Temp 98 °F (36.7 °C)   Resp 16   Ht 5' 3\" (1.6 m)   Wt 85.7 kg (189 lb)   SpO2 100%   Breastfeeding Unknown   BMI 33.48 kg/m²     Temp (24hrs), Av.8 °F (37.1 °C), Min:97.8 °F (36.6 °C), Max:99.6 °F (37.6 °C)        Exam:   Patient without distress. Abdomen soft, fundus firm, nontender                Perineum with normal lochia noted. Lower extremities are negative for swelling, cords or tenderness. Labs:     Lab Results   Component Value Date/Time    WBC 10.3 2021 08:44 PM    HGB 10.2 (L) 2021 08:44 PM    HCT 32.0 (L) 2021 08:44 PM    PLATELET 166  08:44 PM       No results found for this or any previous visit (from the past 24 hour(s)).

## 2021-05-24 NOTE — DISCHARGE SUMMARY
Obstetrical Discharge Summary     Name: Shanta Burnham MRN: [id number]  SSN: [id number]    YOB: 1990  Age: 35 y.o. Sex: female      Admit Date: 2021    Discharge Date: 2021     Admitting Physician: Leon Glaser CNM     Attending Physician:  Jody Tobias MD     Admission Diagnoses: Pregnancy [Z34.90]    Discharge Diagnoses:   Information for the patient's :  Eva Karson [[id number]]   Delivery of a 2.66 kg female infant via Vaginal, Spontaneous on 2021 at 2:32 PM  by Chad Marcos. Apgars were 9  and 10 . Additional Diagnoses:   Hospital Problems  Date Reviewed: 2019        Codes Class Noted POA    Normal labor and delivery ICD-10-CM: O80  ICD-9-CM: 061  2021 Unknown        Pregnancy ICD-10-CM: Z34.90  ICD-9-CM: V22.2  2021 Unknown             Lab Results   Component Value Date/Time    Rubella, External 7.63, immune 10/30/2020 12:00 AM    GrBStrep, External negative 2021 12:00 AM       Hospital Course: Normal hospital course following the delivery. Disposition at Discharge: Home or self care    Discharged Condition: Stable    Patient Instructions:   Current Discharge Medication List      START taking these medications    Details   ibuprofen (MOTRIN) 600 mg tablet Take 1 Tablet by mouth every eight (8) hours as needed for Pain. Qty: 40 Tablet, Refills: 1         CONTINUE these medications which have NOT CHANGED    Details   prenatal vit calc,iron,folic (PRENATAL VITAMIN PO) Take  by mouth. STOP taking these medications       APRI 0.15-0.03 mg tab Comments:   Reason for Stopping:               Reference my discharge instructions.     Follow-up Appointments   Procedures    FOLLOW UP VISIT Appointment in: 6 Weeks     Standing Status:   Standing     Number of Occurrences:   1     Order Specific Question:   Appointment in     Answer:   6 Weeks        Signed By:  Ирина Saeed MD     May 24, 2021

## 2021-05-24 NOTE — ROUTINE PROCESS
0740- Bedside shift change report given to HERB Negron RN (oncoming nurse) by D. Alaine Shone RN (offgoing nurse). Report included the following information SBAR.

## 2021-05-24 NOTE — ROUTINE PROCESS
Bedside and Verbal shift change report given to STEPHANIE Denis (oncoming nurse) by Leonie Abdalla RN (offgoing nurse). Report included the following information SBAR.

## 2021-05-24 NOTE — LACTATION NOTE
This note was copied from a baby's chart. Initial Lactation Consultation - Baby born vaginally yesterday after noon to a  mom at 45 4/7 weeks gestation. Mom states baby has been latching and nursing well. I gave mom some tips on positioning the baby at the breast and then how to help her get baby latched deeply. We were able to get baby latched in the cross cradle hold and the football hold. Feeding Plan: Mother will keep baby skin to skin as often as possible, feed on demand, respond to feeding cues, obtain latch, listen for audible swallowing, be aware of signs of oxytocin release/ cramping, thirst and sleepiness while breastfeeding. Mom will not limit the time the baby is at the breast. She will allow the baby to completely finish one breast and then offer the second breast at each feeding.

## 2021-05-25 VITALS
SYSTOLIC BLOOD PRESSURE: 133 MMHG | HEART RATE: 76 BPM | DIASTOLIC BLOOD PRESSURE: 80 MMHG | OXYGEN SATURATION: 100 % | TEMPERATURE: 97.9 F | HEIGHT: 63 IN | BODY MASS INDEX: 33.49 KG/M2 | RESPIRATION RATE: 15 BRPM | WEIGHT: 189 LBS

## 2021-05-25 PROCEDURE — 74011250637 HC RX REV CODE- 250/637: Performed by: ADVANCED PRACTICE MIDWIFE

## 2021-05-25 RX ADMIN — IBUPROFEN 600 MG: 600 TABLET, FILM COATED ORAL at 00:58

## 2021-05-25 RX ADMIN — IBUPROFEN 600 MG: 600 TABLET, FILM COATED ORAL at 07:11

## 2021-05-25 RX ADMIN — Medication 1 TABLET: at 09:05

## 2021-05-25 NOTE — PROGRESS NOTES
Bedside shift change report given to [facility] (oncoming nurse) by Cash Ramirez. Hardy Charles RN (offgoing nurse). Report included the following information SBAR, Kardex, Intake/Output and MAR.

## 2021-05-25 NOTE — PROGRESS NOTES
Post-Partum Day Number 2 Progress Note    Claudia Thompson     Assessment: Doing well, post partum day 2    Plan:   1. Discharge home today  2. Follow up in office in 6 weeks with Shady Ashraf MD  3. Post partum activity advised, diet as tolerated  4. Discharge Medications: ibuprofen, percocet and medications prior to admission    Information for the patient's :  Mer Ca [[id number]]   Vaginal, Spontaneous    Patient doing well without significant complaint. Voiding without difficulty, normal lochia. Vitals:  Visit Vitals  /74   Pulse 64   Temp 97.7 °F (36.5 °C)   Resp 16   Ht 5' 3\" (1.6 m)   Wt 85.7 kg (189 lb)   SpO2 100%   Breastfeeding Unknown   BMI 33.48 kg/m²     Temp (24hrs), Av.1 °F (36.7 °C), Min:97.7 °F (36.5 °C), Max:98.4 °F (36.9 °C)      Exam:         Patient without distress. Abdomen soft, fundus firm, nontender                 Lower extremities are negative for swelling, cords or tenderness. Labs:     Lab Results   Component Value Date/Time    WBC 10.3 2021 08:44 PM    HGB 10.2 (L) 2021 08:44 PM    HCT 32.0 (L) 2021 08:44 PM    PLATELET 628  08:44 PM       No results found for this or any previous visit (from the past 24 hour(s)).

## 2021-05-25 NOTE — DISCHARGE INSTRUCTIONS
POST DELIVERY DISCHARGE INSTRUCTIONS    Name: Krysten La  YOB: 1990  Primary Diagnosis: Active Problems:    Pregnancy (2021)      Normal labor and delivery (2021)        General:     Diet/Diet Restrictions:  · Eight 8-ounce glasses of fluid daily (water, juices); avoid excessive caffeine intake. · Meals/snacks as desired which are high in fiber and carbohydrates and low in fat and cholesterol. Physical Activity / Restrictions / Safety:     · Avoid heavy lifting, no more that 8 lbs. For 2-3 weeks;   · Limit use of stairs to 2 times daily for the first week home. · No driving for one week. · Avoid intercourse 4-6 weeks, no douching or tampon use. · Check with obstetrician before starting or resuming an exercise program.      Discharge Instructions/Special Treatment/Home Care Needs:     · Continue prenatal vitamins. · Continue to use squirt bottle with warm water on your episiotomy after each bathroom use until bleeding stops. · If steri-strips applied to your incision, remove in 7-10 days. Call your doctor for the following:     · Fever over 101 degrees by mouth. · Vaginal bleeding heavier than a normal menstrual period or clots larger than a golf ball. · Red streaks or increased swelling of legs, painful red streaks on your breast.  · Painful urination, constipation and increased pain or swelling or discharge with your incision. · If you feel extremely anxious or overwhelmed. · If you have thoughts of harming yourself and/or your baby. Pain Management:     · Take Acetaminophen (Tylenol) or Ibuprofen (Advil, Motrin), as directed for pain. · Use a warm Sitz bath 3 times daily to relieve episiotomy or hemorrhoidal discomfort. · For hemorrhoidal discomfort, use Tucks and Anusol cream as needed and directed. · Heating pad to  incision as needed.      Follow-Up Care:     Appointment with MD:   Follow-up Appointments   Procedures    FOLLOW UP VISIT Appointment in: 6 Weeks     Standing Status:   Standing     Number of Occurrences:   1     Order Specific Question:   Appointment in     Answer:   6 Weeks     Telephone number: [phone]    Signed By: Zackery Lucio MD                                                                                                   Date: 5/24/2021 Time: 9:06 AM    We know that all of us are dealing with a tremendous amount of uncertainty, confusion and disruption to our daily lives, which may result in increased anxiety, depression and fear. If you are feeling unsettled or worse, please know that we are here to help. During this time of increased caution and care for one another, [facility] [place] ([address] [place] [address]) is offering virtual support groups to ALL MOTHERS in [place] regardless of the age of your child/children as a way to help weather this emotional storm together. Social support is an important part of self-care during this time of physical distancing. Virtual postpartum support group meetings available at www. postpartumva.org  Warm Line: 522.519.7321    Breastfeeding Support Groups (virtual)  1st and 3rd Wednesday of each month  2nd and 4th Tuesday of each month    [place] at www.[employer] under the \"About Us\" and \"Classes and Events tabs\"

## 2021-05-25 NOTE — LACTATION NOTE
This note was copied from a baby's chart. Mom and baby scheduled for discharge today. I did not see the baby at the breast. Mom states baby has been nursing well and has improved throughout post partum stay, deep latch maintained, mother is comfortable, milk is in transition, baby feeding vigorously with rhythmic suck, swallow, breathe pattern, with audible swallowing, and evident milk transfer, both breasts offered, baby is asleep following feeding. Baby is feeding on demand. [de-identified] bili is 7.8 in the low intermediate risk zone. Baby's weight loss is 7.7% at 35 hours of life. (24 hour weight loss 6.0%)    We reviewed cluster feeding. Frequent feeding during the brief behavioral phase preceeding milk transition is called cluster feeding. Typical  behavior: baby becomes vigorous at the breast and wants to feed frequently- every 1-2 hours for several feedings. Emptying of the breast twice produces double in subsequent feedings. This is the normal process by which the baby demands his/her supply. This type of frequent feeding is the stimulation which causes lactogenesis II (milk coming in). We discussed engorgement. Breasts may become engorged when milk \"comes in\". How milk is made / normal phases of milk production, supply and demand discussed. Taught care of engorged breasts - frequent breastfeeding encouraged. Mom should put the baby to the breast and allow him to completely finish one breast before offering the second breast. She may pump a couple minutes after nursing for comfort. She can apply ice to the breasts for 10-15 minutes after nursing as needed. Pumping and returning to work/school discussed:  Start pumping for storage after first 2-3 weeks- about one hour after first AM feeding when supply is most abundant, once a day to start, timing of pumping at work/school, storage options and guidelines, and clean private pumping location (never in the bathroom).      Breast feeding teaching completed and all questions answered.

## 2022-03-18 PROBLEM — Z34.90 PREGNANCY: Status: ACTIVE | Noted: 2021-05-22

## 2022-04-04 NOTE — ROUTINE PROCESS
0740- Bedside shift change report given to S. Gloria Harinder Pratt, RN (oncoming nurse) by Maricarmen Laureano. Oralia Canales RN (offgoing nurse). Report included the following information SBAR.     1200- I have reviewed discharge instructions with the patient. The patient verbalized understanding. Reviewed with Dr. Rosado that patient will not have dental clearance until May.    Patients 4/7 prolia injection should be rescheduled for after we have obtained dental clearance.    Writer contacted patient to advise  Patient is scheduled to see the dentist 5/11 [facility]  Writer will reach out to them on 5/12  Patient scheduled or [facility] 5/18 - start prolia

## 2022-09-23 ENCOUNTER — HOSPITAL ENCOUNTER (EMERGENCY)
Age: 32
Discharge: HOME | End: 2022-09-23
Attending: EMERGENCY MEDICINE
Payer: COMMERCIAL

## 2022-09-23 ENCOUNTER — APPOINTMENT (OUTPATIENT)
Dept: CT IMAGING | Age: 32
End: 2022-09-23
Attending: EMERGENCY MEDICINE
Payer: COMMERCIAL

## 2022-09-23 VITALS
OXYGEN SATURATION: 98 % | SYSTOLIC BLOOD PRESSURE: 128 MMHG | BODY MASS INDEX: 28.35 KG/M2 | DIASTOLIC BLOOD PRESSURE: 74 MMHG | TEMPERATURE: 98.4 F | WEIGHT: 160 LBS | RESPIRATION RATE: 14 BRPM | HEIGHT: 63 IN | HEART RATE: 74 BPM

## 2022-09-23 DIAGNOSIS — R19.7 DIARRHEA, UNSPECIFIED TYPE: Primary | ICD-10-CM

## 2022-09-23 LAB
ALBUMIN SERPL-MCNC: 3.5 G/DL (ref 3.5–5)
ALBUMIN/GLOB SERPL: 0.9 {RATIO} (ref 1.1–2.2)
ALP SERPL-CCNC: 66 U/L (ref 45–117)
ALT SERPL-CCNC: 25 U/L (ref 12–78)
ANION GAP SERPL CALC-SCNC: 5 MMOL/L (ref 5–15)
AST SERPL-CCNC: 15 U/L (ref 15–37)
BASOPHILS # BLD: 0 K/UL (ref 0–0.1)
BASOPHILS NFR BLD: 0 % (ref 0–1)
BILIRUB SERPL-MCNC: 0.2 MG/DL (ref 0.2–1)
BUN SERPL-MCNC: 14 MG/DL (ref 6–20)
BUN/CREAT SERPL: 23 (ref 12–20)
CALCIUM SERPL-MCNC: 8.6 MG/DL (ref 8.5–10.1)
CHLORIDE SERPL-SCNC: 107 MMOL/L (ref 97–108)
CO2 SERPL-SCNC: 29 MMOL/L (ref 21–32)
COMMENT, HOLDF: NORMAL
CREAT SERPL-MCNC: 0.62 MG/DL (ref 0.55–1.02)
DIFFERENTIAL METHOD BLD: ABNORMAL
EOSINOPHIL # BLD: 0 K/UL (ref 0–0.4)
EOSINOPHIL NFR BLD: 0 % (ref 0–7)
ERYTHROCYTE [DISTWIDTH] IN BLOOD BY AUTOMATED COUNT: 12.6 % (ref 11.5–14.5)
GLOBULIN SER CALC-MCNC: 3.7 G/DL (ref 2–4)
GLUCOSE SERPL-MCNC: 106 MG/DL (ref 65–100)
HCG UR QL: NEGATIVE
HCT VFR BLD AUTO: 36.1 % (ref 35–47)
HGB BLD-MCNC: 12.1 G/DL (ref 11.5–16)
IMM GRANULOCYTES # BLD AUTO: 0.1 K/UL (ref 0–0.04)
IMM GRANULOCYTES NFR BLD AUTO: 0 % (ref 0–0.5)
LYMPHOCYTES # BLD: 1.7 K/UL (ref 0.8–3.5)
LYMPHOCYTES NFR BLD: 12 % (ref 12–49)
MCH RBC QN AUTO: 29.5 PG (ref 26–34)
MCHC RBC AUTO-ENTMCNC: 33.5 G/DL (ref 30–36.5)
MCV RBC AUTO: 88 FL (ref 80–99)
MONOCYTES # BLD: 0.6 K/UL (ref 0–1)
MONOCYTES NFR BLD: 4 % (ref 5–13)
NEUTS SEG # BLD: 12.2 K/UL (ref 1.8–8)
NEUTS SEG NFR BLD: 84 % (ref 32–75)
NRBC # BLD: 0 K/UL (ref 0–0.01)
NRBC BLD-RTO: 0 PER 100 WBC
PLATELET # BLD AUTO: 290 K/UL (ref 150–400)
PMV BLD AUTO: 10.8 FL (ref 8.9–12.9)
POTASSIUM SERPL-SCNC: 3.7 MMOL/L (ref 3.5–5.1)
PROT SERPL-MCNC: 7.2 G/DL (ref 6.4–8.2)
RBC # BLD AUTO: 4.1 M/UL (ref 3.8–5.2)
SAMPLES BEING HELD,HOLD: NORMAL
SODIUM SERPL-SCNC: 141 MMOL/L (ref 136–145)
WBC # BLD AUTO: 14.6 K/UL (ref 3.6–11)

## 2022-09-23 PROCEDURE — 99285 EMERGENCY DEPT VISIT HI MDM: CPT

## 2022-09-23 PROCEDURE — 74177 CT ABD & PELVIS W/CONTRAST: CPT

## 2022-09-23 PROCEDURE — 36415 COLL VENOUS BLD VENIPUNCTURE: CPT

## 2022-09-23 PROCEDURE — 81025 URINE PREGNANCY TEST: CPT

## 2022-09-23 PROCEDURE — 74011250636 HC RX REV CODE- 250/636: Performed by: EMERGENCY MEDICINE

## 2022-09-23 PROCEDURE — 74011000636 HC RX REV CODE- 636: Performed by: EMERGENCY MEDICINE

## 2022-09-23 PROCEDURE — 85025 COMPLETE CBC W/AUTO DIFF WBC: CPT

## 2022-09-23 PROCEDURE — 80053 COMPREHEN METABOLIC PANEL: CPT

## 2022-09-23 PROCEDURE — 96374 THER/PROPH/DIAG INJ IV PUSH: CPT

## 2022-09-23 RX ORDER — CIPROFLOXACIN 500 MG/1
500 TABLET ORAL 2 TIMES DAILY
Qty: 6 TABLET | Refills: 0 | Status: SHIPPED | OUTPATIENT
Start: 2022-09-23 | End: 2022-09-26

## 2022-09-23 RX ORDER — ALBUTEROL SULFATE 90 UG/1
2 AEROSOL, METERED RESPIRATORY (INHALATION)
Qty: 18 G | Refills: 0 | Status: SHIPPED | OUTPATIENT
Start: 2022-09-23

## 2022-09-23 RX ORDER — KETOROLAC TROMETHAMINE 30 MG/ML
30 INJECTION, SOLUTION INTRAMUSCULAR; INTRAVENOUS ONCE
Status: COMPLETED | OUTPATIENT
Start: 2022-09-23 | End: 2022-09-23

## 2022-09-23 RX ADMIN — IOPAMIDOL 100 ML: 755 INJECTION, SOLUTION INTRAVENOUS at 19:04

## 2022-09-23 RX ADMIN — KETOROLAC TROMETHAMINE 30 MG: 30 INJECTION, SOLUTION INTRAMUSCULAR at 17:22

## 2022-09-23 RX ADMIN — SODIUM CHLORIDE 1000 ML: 9 INJECTION, SOLUTION INTRAVENOUS at 17:22

## 2022-09-23 NOTE — ED TRIAGE NOTES
Pt reports recent travel to [facility], reports on 9/20/22 presented with hives and was seen in ER and given IV steroids and followed up with allergist and was given steroid dose pack. Pt has had fever and diarrhea started yesterday.     Pt has had abd pain with diarrhea

## 2022-09-23 NOTE — DISCHARGE INSTRUCTIONS
Continue taking the allergy medication, steroids and pepcid for your hives. If they worsen, or you have trouble breathing or swallowing, please use your Epi Pen and return to the ER. Cipro: 1 pill twice a day for 3 days. Return to ER for any fever, chills, chest pain, shortness of breath, difficulty breathing, nausea, vomiting.

## 2022-09-24 NOTE — ED NOTES
The following have been ordered and reviewed:    CT ABD PELV W CONT    Result Date: 9/23/2022  EXAM: CT ABD PELV W CONT INDICATION: diarrhea, abdominal pain, fever COMPARISON: None CONTRAST: 100 mL of Isovue-370. ORAL CONTRAST: None TECHNIQUE: Following the uneventful intravenous administration of contrast, thin axial images were obtained through the abdomen and pelvis. Coronal and sagittal reconstructions were generated. CT dose reduction was achieved through use of a standardized protocol tailored for this examination and automatic exposure control for dose modulation. FINDINGS: LOWER THORAX: No significant abnormality in the incidentally imaged lower chest. LIVER: No mass. BILIARY TREE: Status post cholecystectomy. CBD is not dilated. SPLEEN: within normal limits. PANCREAS: No mass or ductal dilatation. ADRENALS: Unremarkable. KIDNEYS: No mass, calculus, or hydronephrosis. STOMACH: Unremarkable. SMALL BOWEL: No dilatation or wall thickening. COLON: No dilatation or wall thickening. APPENDIX: Within normal limits. PERITONEUM: No ascites or pneumoperitoneum. RETROPERITONEUM: No lymphadenopathy or aortic aneurysm. REPRODUCTIVE ORGANS: Unremarkable. URINARY BLADDER: Decompressed. BONES: No destructive bone lesion. ABDOMINAL WALL: No mass or hernia. ADDITIONAL COMMENTS: N/A     No acute abnormality. 8:16 PM  Pt reevaluated   The patient is resting comfortably and feels better, is alert and in no distress. The repeat examination is unremarkable and benign; in particular, there is no discomfort at McBurney's point. The history, exam, diagnostic testing, and current condition do not suggest acute appendicitis, bowel obstruction, incarcerated hernia, acute cholecystitis, bowel perforation, major gastrointestinal bleeding, severe diverticulitis, sepsis, or other significant pathology to warrant further testing, continued ED treatment, admission, or surgical evaluation at this point.  The vital signs have been stable and are within normal limits at this time. The patient does not have uncontrollable pain, intractable vomiting, or other significant symptoms. The patient's condition is stable and appropriate for discharge. The patient will pursue further outpatient evaluation with the primary care physician or other designated or consulting physician as indicated in the discharge instructions. Discussion with Dr. Mushtaq Waite, pt to continue her medicines for allergic reactions. Will give 3 day of cipro for possible infectious diarrhea. Patient's results have been reviewed with them. Patient and/or family have verbally conveyed their understanding and agreement of the patient's signs, symptoms, diagnosis, treatment and prognosis and additionally agree to follow up as recommended or return to the Emergency Room should their condition change prior to follow-up. Discharge instructions have also been provided to the patient with some educational information regarding their diagnosis as well a list of reasons why they would want to return to the ER prior to their follow-up appointment should their condition change.

## 2022-09-24 NOTE — ED NOTES
Went over [employer] instructions with pt who had no further questions. Pt ambulatory with steady gait and talking in complete sentences. Ano x 4 gcs 15.  Going home with spouse

## 2022-09-27 NOTE — ED PROVIDER NOTES
Patient is a 79-year-old female who presents with ongoing allergic reaction, fever, diarrhea, abdominal pain. Patient reports that she was out of the country and returned 2 weeks ago. She had 1 week of lower abdominal cramping and diarrhea which has improved. Earlier this week she began having diffuse hives. She was seen at outside ER and prescribed Benadryl and steroids. She then followed up with allergist who added Pepcid to her regimen. Patient notes that her hives keep waxing and waning with no clear pattern. Denies any new clothing, new detergent, new environment. Patient says that earlier today she began having recurrence of her hives, felt some tightness in her throat. She has an EpiPen that was prescribed in the ED, but did not use it. Notes that currently his symptoms have improved. She noticed she had a fever today, and is concerned that she could have a bacterial infection in her abdomen which is causing the hives. No past medical history on file.     Past Surgical History:   Procedure Laterality Date    HX LAP CHOLECYSTECTOMY  04/11/2019    HX OTHER SURGICAL  04/2018    Gall Bladder    HX OTHER SURGICAL  2010    wisdom teeth         Family History:   Problem Relation Age of Onset    Diabetes Father     Hypertension Father     Depression Father     Cancer Paternal Uncle     No Known Problems Mother        Social History     Socioeconomic History    Marital status:      Spouse name: Not on file    Number of children: Not on file    Years of education: Not on file    Highest education level: Not on file   Occupational History    Not on file   Tobacco Use    Smoking status: Never    Smokeless tobacco: Never   Vaping Use    Vaping Use: Never used   Substance and Sexual Activity    Alcohol use: Not Currently     Comment: occassional    Drug use: Never    Sexual activity: Not on file   Other Topics Concern     Service No    Blood Transfusions No    Caffeine Concern No Occupational Exposure No    Hobby Hazards No    Sleep Concern No    Stress Concern No    Weight Concern No    Special Diet No    Back Care No    Exercise No    Bike Helmet No    Seat Belt No    Self-Exams No   Social History Narrative    Not on file     Social Determinants of Health     Financial Resource Strain: Not on file   Food Insecurity: Not on file   Transportation Needs: Not on file   Physical Activity: Not on file   Stress: Not on file   Social Connections: Not on file   Intimate Partner Violence: Not on file   Housing Stability: Not on file         ALLERGIES: Patient has no known allergies. Review of Systems   Constitutional:  Negative for chills and fever. HENT:  Negative for drooling and nosebleeds. Eyes:  Negative for pain and itching. Respiratory:  Negative for choking and stridor. Cardiovascular:  Negative for leg swelling. Gastrointestinal:  Positive for abdominal pain and diarrhea. Negative for abdominal distention and rectal pain. Endocrine: Negative for heat intolerance and polyphagia. Genitourinary:  Negative for enuresis and genital sores. Musculoskeletal:  Negative for arthralgias and joint swelling. Skin:  Positive for rash. Negative for color change. Allergic/Immunologic: Negative for immunocompromised state. Neurological:  Negative for tremors and speech difficulty. Hematological:  Negative for adenopathy. Psychiatric/Behavioral:  Negative for dysphoric mood and sleep disturbance. Vitals:    09/23/22 1811 09/23/22 1826 09/23/22 1921 09/23/22 2015   BP: 98/70 109/86 115/75 128/74   Pulse:  94 75 74   Resp:  17 16 14   Temp:  98 °F (36.7 °C) 98.4 °F (36.9 °C) 98.4 °F (36.9 °C)   SpO2: 100% 100% 99% 98%   Weight:       Height:                Physical Exam  Vitals and nursing note reviewed. Constitutional:       Appearance: She is well-developed. She is not toxic-appearing or diaphoretic. HENT:      Head: Normocephalic.       Nose: Nose normal. Mouth/Throat:      Mouth: Mucous membranes are moist.      Pharynx: No oropharyngeal exudate or posterior oropharyngeal erythema. Eyes:      Conjunctiva/sclera: Conjunctivae normal.   Cardiovascular:      Rate and Rhythm: Normal rate and regular rhythm. Heart sounds: Normal heart sounds. Pulmonary:      Effort: Pulmonary effort is normal. No respiratory distress. Breath sounds: Normal breath sounds. Abdominal:      General: There is no distension. Palpations: Abdomen is soft. Tenderness: There is no abdominal tenderness. There is no guarding. Musculoskeletal:         General: No deformity. Normal range of motion. Cervical back: Normal range of motion and neck supple. Skin:     General: Skin is warm and dry. Findings: No rash. Neurological:      Mental Status: She is alert. Coordination: Coordination normal.   Psychiatric:         Behavior: Behavior normal.        MDM  Number of Diagnoses or Management Options  Diarrhea, unspecified type  Diagnosis management comments: I recommended the patient continue taking the Benadryl, Pepcid, prednisone that has been prescribed by outside ER and her allergist.  Discussed signs and symptoms for which she should use her EpiPen and then present to the ED. CT scan to rule out acute intra-abdominal pathology which was negative today. Patient says she does not have any other rhinorrhea, cough and we decided to hold off on COVID swab. Given recent travel, will cover with Cipro for traveler's diarrhea. Patient stable for discharge. Procedures    Patient's results have been reviewed with them. Patient and/or family have verbally conveyed their understanding and agreement of the patient's signs, symptoms, diagnosis, treatment and prognosis and additionally agree to follow up as recommended or return to the Emergency Room should their condition change prior to follow-up.   Discharge instructions have also been provided to the patient with some educational information regarding their diagnosis as well a list of reasons why they would want to return to the ER prior to their follow-up appointment should their condition change.

## 2023-06-10 LAB
ABO, EXTERNAL RESULT: NORMAL
C. TRACHOMATIS, EXTERNAL RESULT: NEGATIVE
HEP B, EXTERNAL RESULT: NEGATIVE
HEPATITIS C ANTIBODY, EXTERNAL RESULT: NORMAL
HIV, EXTERNAL RESULT: NORMAL
RH FACTOR, EXTERNAL RESULT: POSITIVE
RUBELLA TITER, EXTERNAL RESULT: NORMAL

## 2023-06-11 LAB — T. PALLIDUM (SYPHILIS) ANTIBODY, EXTERNAL RESULT: NORMAL

## 2023-06-13 LAB — N. GONORRHOEAE, EXTERNAL RESULT: NEGATIVE

## 2023-09-25 ENCOUNTER — HOSPITAL ENCOUNTER (OUTPATIENT)
Facility: HOSPITAL | Age: 33
Setting detail: OBSERVATION
Discharge: HOME | End: 2023-09-26
Attending: OBSTETRICS & GYNECOLOGY | Admitting: OBSTETRICS & GYNECOLOGY
Payer: COMMERCIAL

## 2023-09-25 PROBLEM — O41.02X1 OLIGOHYDRAMNIOS ANTEPARTUM, SECOND TRIMESTER, FETUS 1: Status: ACTIVE | Noted: 2023-09-25

## 2023-09-25 PROCEDURE — 96360 HYDRATION IV INFUSION INIT: CPT

## 2023-09-25 PROCEDURE — 2580000003 HC RX 258: Performed by: OBSTETRICS & GYNECOLOGY

## 2023-09-25 PROCEDURE — 94761 N-INVAS EAR/PLS OXIMETRY MLT: CPT

## 2023-09-25 PROCEDURE — 96361 HYDRATE IV INFUSION ADD-ON: CPT

## 2023-09-25 PROCEDURE — G0378 HOSPITAL OBSERVATION PER HR: HCPCS

## 2023-09-25 PROCEDURE — 96372 THER/PROPH/DIAG INJ SC/IM: CPT

## 2023-09-25 PROCEDURE — G0379 DIRECT REFER HOSPITAL OBSERV: HCPCS

## 2023-09-25 PROCEDURE — 6360000002 HC RX W HCPCS: Performed by: OBSTETRICS & GYNECOLOGY

## 2023-09-25 RX ORDER — MULTIVIT WITH MINERALS/LUTEIN
250 TABLET ORAL DAILY
COMMUNITY

## 2023-09-25 RX ORDER — ONDANSETRON 2 MG/ML
4 INJECTION INTRAMUSCULAR; INTRAVENOUS EVERY 6 HOURS PRN
Status: DISCONTINUED | OUTPATIENT
Start: 2023-09-25 | End: 2023-09-26 | Stop reason: HOSPADM

## 2023-09-25 RX ORDER — ONDANSETRON 4 MG/1
4 TABLET, ORALLY DISINTEGRATING ORAL EVERY 8 HOURS PRN
Status: DISCONTINUED | OUTPATIENT
Start: 2023-09-25 | End: 2023-09-26 | Stop reason: HOSPADM

## 2023-09-25 RX ORDER — SODIUM CHLORIDE, SODIUM LACTATE, POTASSIUM CHLORIDE, CALCIUM CHLORIDE 600; 310; 30; 20 MG/100ML; MG/100ML; MG/100ML; MG/100ML
INJECTION, SOLUTION INTRAVENOUS CONTINUOUS
Status: DISCONTINUED | OUTPATIENT
Start: 2023-09-25 | End: 2023-09-26 | Stop reason: HOSPADM

## 2023-09-25 RX ORDER — MAGNESIUM 30 MG
30 TABLET ORAL 2 TIMES DAILY
COMMUNITY

## 2023-09-25 RX ORDER — BETAMETHASONE SODIUM PHOSPHATE AND BETAMETHASONE ACETATE 3; 3 MG/ML; MG/ML
12 INJECTION, SUSPENSION INTRA-ARTICULAR; INTRALESIONAL; INTRAMUSCULAR; SOFT TISSUE EVERY 24 HOURS
Status: COMPLETED | OUTPATIENT
Start: 2023-09-25 | End: 2023-09-26

## 2023-09-25 RX ORDER — SODIUM CHLORIDE, SODIUM LACTATE, POTASSIUM CHLORIDE, AND CALCIUM CHLORIDE .6; .31; .03; .02 G/100ML; G/100ML; G/100ML; G/100ML
1000 INJECTION, SOLUTION INTRAVENOUS ONCE
Status: COMPLETED | OUTPATIENT
Start: 2023-09-25 | End: 2023-09-25

## 2023-09-25 RX ADMIN — BETAMETHASONE SODIUM PHOSPHATE AND BETAMETHASONE ACETATE 12 MG: 3; 3 INJECTION, SUSPENSION INTRA-ARTICULAR; INTRALESIONAL; INTRAMUSCULAR at 12:16

## 2023-09-25 RX ADMIN — SODIUM CHLORIDE, POTASSIUM CHLORIDE, SODIUM LACTATE AND CALCIUM CHLORIDE: 600; 310; 30; 20 INJECTION, SOLUTION INTRAVENOUS at 13:10

## 2023-09-25 RX ADMIN — SODIUM CHLORIDE, POTASSIUM CHLORIDE, SODIUM LACTATE AND CALCIUM CHLORIDE 1000 ML: 600; 310; 30; 20 INJECTION, SOLUTION INTRAVENOUS at 12:06

## 2023-09-25 ASSESSMENT — PAIN DESCRIPTION - RADICULAR PAIN: RADICULAR_PAIN: ABSENT

## 2023-09-25 NOTE — PROGRESS NOTES
1125 - Patient arrived to unit from [facility] office for low ÁNGELA. Fluids, MFM consult, and Betamethasone ordered by Dr. Tracie Hogan. 1228 - This RN spoke with MFM. Patient to be seen by Dr. Etta Beal at 0930 tomorrow. 1900 - Verbal shift change report given to Radha Saeed RN (oncoming nurse) by Jennifer Keita RN (offgoing nurse). Report included the following information Nurse Handoff Report.

## 2023-09-25 NOTE — H&P
History & Physical    Name: Mathieu Villafana MRN: [id number]  SSN: [id number]    YOB: 1990  Age: 29 y.o. Sex: female      Subjective:     Reason for Admission:  Oligohydramnios    History of Present Illness: Ms. Justine Quintana is a 29 y.o.  female with an estimated gestational age of 19w1d with Estimated Date of Delivery: 24. Patient was seen in the office today for [address] and had ÁNGELA 4.4cm. She has reported leaking urine since before pregnancy and has had leaking during pregnancy. ROM workup today was negative. MFM recommend admission for BMZx2 and repeat US for ÁNGELA tomorrow. Pregnancy has been otherwise uncomplicated. OB History    Para Term  AB Living   3   1   1 1   SAB IAB Ectopic Molar Multiple Live Births                    # Outcome Date GA Lbr Luis F/2nd Weight Sex Delivery Anes PTL Lv   1 Current              Past Medical History:   Diagnosis Date    Asthma      Past Surgical History:   Procedure Laterality Date    CHOLECYSTECTOMY, LAPAROSCOPIC  2019    OTHER SURGICAL HISTORY  2018    Gall Bladder    OTHER SURGICAL HISTORY  2010    wisdom teeth     Social History     Occupational History    Not on file   Tobacco Use    Smoking status: Never    Smokeless tobacco: Never   Substance and Sexual Activity    Alcohol use: Not Currently    Drug use: Never    Sexual activity: Not on file      Family History   Problem Relation Age of Onset    No Known Problems Mother     Cancer Paternal Uncle     Depression Father     Hypertension Father     Diabetes Father        No Known Allergies  Prior to Admission medications    Medication Sig Start Date End Date Taking?  Authorizing Provider   Ascorbic Acid (VITAMIN C) 250 MG tablet Take 1 tablet by mouth daily   Yes Historical Provider, MD   magnesium 30 MG tablet Take 1 tablet by mouth 2 times daily   Yes Historical Provider, MD   albuterol sulfate HFA (PROVENTIL;VENTOLIN;PROAIR) 108 (90 Base) MCG/ACT inhaler Inhale 2 puffs into the

## 2023-09-26 VITALS
SYSTOLIC BLOOD PRESSURE: 105 MMHG | TEMPERATURE: 97.2 F | BODY MASS INDEX: 29.77 KG/M2 | DIASTOLIC BLOOD PRESSURE: 60 MMHG | RESPIRATION RATE: 16 BRPM | HEIGHT: 63 IN | HEART RATE: 71 BPM | OXYGEN SATURATION: 99 % | WEIGHT: 168 LBS

## 2023-09-26 LAB
AMNISURE, POC: NEGATIVE
Lab: NORMAL
NEGATIVE QC PASS/FAIL: NORMAL
POSITIVE QC PASS/FAIL: NORMAL

## 2023-09-26 PROCEDURE — G0378 HOSPITAL OBSERVATION PER HR: HCPCS

## 2023-09-26 PROCEDURE — 6360000002 HC RX W HCPCS: Performed by: OBSTETRICS & GYNECOLOGY

## 2023-09-26 PROCEDURE — 96372 THER/PROPH/DIAG INJ SC/IM: CPT

## 2023-09-26 PROCEDURE — 94761 N-INVAS EAR/PLS OXIMETRY MLT: CPT

## 2023-09-26 PROCEDURE — 96361 HYDRATE IV INFUSION ADD-ON: CPT

## 2023-09-26 RX ORDER — SWAB
1 SWAB, NON-MEDICATED MISCELLANEOUS DAILY
Status: DISCONTINUED | OUTPATIENT
Start: 2023-09-26 | End: 2023-09-26 | Stop reason: HOSPADM

## 2023-09-26 RX ADMIN — BETAMETHASONE SODIUM PHOSPHATE AND BETAMETHASONE ACETATE 12 MG: 3; 3 INJECTION, SUSPENSION INTRA-ARTICULAR; INTRALESIONAL; INTRAMUSCULAR at 12:24

## 2023-09-26 ASSESSMENT — PAIN DESCRIPTION - RADICULAR PAIN: RADICULAR_PAIN: ABSENT

## 2023-09-26 NOTE — PROGRESS NOTES
Discussed with MFM --     ÁNGELA now ~ 9. All work up for PPROM has been negative. Will plan for administration of second BMZ shot and then d/c home with close follow up. Reviewed with pt -- will plan to be out for 2 weeks. Reviewed PTL precautions.

## 2023-09-26 NOTE — PROGRESS NOTES
0700 - Verbal shift change report given to Russ Silverio (oncoming nurse) by Anne Montaño RN (offgoing nurse). Report included the following information Nurse Handoff Report. 0930 - Patient at [facility]. 1130 - Patient back from [facility].    1200 - Dr. Pao Dominguez spoke with [facility] doctor. Okay for patient to be discharged home with follow up appt. 1258 - Patient ambulated safely off unit.

## 2023-09-26 NOTE — PLAN OF CARE
Problem: Pain  Goal: Verbalizes/displays adequate comfort level or baseline comfort level  9/25/2023 2132 by Mariana Castellanos RN  Outcome: Progressing  9/25/2023 1238 by Makayla Marie RN  Outcome: Progressing  Flowsheets (Taken 9/25/2023 1149)  Verbalizes/displays adequate comfort level or baseline comfort level: Encourage patient to monitor pain and request assistance

## 2023-09-26 NOTE — DISCHARGE SUMMARY
Obstetrical Discharge Summary     Name: Sandrine Dwyer MRN: [id number]  SSN: [id number]    YOB: 1990  Age: 29 y.o. Sex: female      Admit Date: 9/25/2023    Discharge Date: 9/26/2023     Admitting Physician: Radha Vázquez MD     Attending Physician:  Radha Vázquez MD     Admission Diagnoses: Oligohydramnios antepartum, second trimester, fetus 1 [O41.02X1]    Discharge Diagnoses: This patient has no babies on file. Additional Diagnoses:  No components found for: \"OBEXTABORH\", \"OBEXTABSCRN\", \"OBEXTRUBELLA\", \"OBEXTGRBS\"    Hospital Course: Ms. Kalyn Recinos was sent to L&D for evaluation of oligohydramnios (ÁNGELA 4.4). She received a course of betamethasone. Amnisure was negative, and pelvic exam in the office was negative for ROM. She had IVF hydration, repeat US demonstrated ÁNGELA 9. Seen by MFM, deemed appropriate for discharge with close follow up. Patient Instructions:   Current Discharge Medication List        CONTINUE these medications which have NOT CHANGED    Details   Ascorbic Acid (VITAMIN C) 250 MG tablet Take 1 tablet by mouth daily      magnesium 30 MG tablet Take 1 tablet by mouth 2 times daily      albuterol sulfate HFA (PROVENTIL;VENTOLIN;PROAIR) 108 (90 Base) MCG/ACT inhaler Inhale 2 puffs into the lungs every 4 hours as needed           STOP taking these medications       ibuprofen (ADVIL;MOTRIN) 600 MG tablet Comments:   Reason for Stopping:               Disposition at Discharge: Home or self care    Condition at Discharge: Stable    Reference my discharge instructions. No follow-ups on file.      Signed By:  Mehdi Dominguez MD     September 26, 2023

## 2023-09-26 NOTE — PROGRESS NOTES
1905:Bedside and Verbal shift change report given to Joseph Bedoya RN (oncoming nurse) by Suzann Brunner RN (offgoing nurse). Report included the following information Nurse Handoff Report, Intake/Output, MAR, and Recent Results. 0700: Bedside and Verbal shift change report given to [address] (oncoming nurse) by Joseph Bedoya RN (offgoing nurse). Report included the following information Nurse Handoff Report, MAR, Recent Results, and Med Rec Status.

## 2023-10-03 ENCOUNTER — ROUTINE PRENATAL (OUTPATIENT)
Age: 33
End: 2023-10-03
Payer: COMMERCIAL

## 2023-10-03 VITALS — DIASTOLIC BLOOD PRESSURE: 69 MMHG | SYSTOLIC BLOOD PRESSURE: 109 MMHG | HEART RATE: 84 BPM

## 2023-10-03 DIAGNOSIS — O41.02X1 OLIGOHYDRAMNIOS ANTEPARTUM, SECOND TRIMESTER, FETUS 1: Primary | ICD-10-CM

## 2023-10-03 PROCEDURE — 99213 OFFICE O/P EST LOW 20 MIN: CPT | Performed by: OBSTETRICS & GYNECOLOGY

## 2023-10-03 PROCEDURE — 76818 FETAL BIOPHYS PROFILE W/NST: CPT | Performed by: OBSTETRICS & GYNECOLOGY

## 2023-10-03 PROCEDURE — 76816 OB US FOLLOW-UP PER FETUS: CPT | Performed by: OBSTETRICS & GYNECOLOGY

## 2023-10-03 NOTE — PROCEDURES
PATIENT: Milly Dean   -  : 1990   -  DOS:10/03/2023   -  INTERPRETING PROVIDER:Rubio Jackson,   Indication  ========    Fetal anatomy survey . Suspected oligohydramnios    Method  ======    Transabdominal ultrasound examination. View: Suboptimal view: limited by fetal position. Suboptimal view: limited by late gestational age    Pregnancy  =========    [place] pregnancy. Number of fetuses: 1    Dating  ======    LMP on: 2023  GA by LMP 32 w + 3 d  CHET by LMP: 2024  Ultrasound examination on: 10/3/2023  GA by U/S based upon: [facility], BPD, Femur, HC  GA by U/S 25 w + 4 d  CHET by U/S: 2024  Assigned: based on the LMP, selected on 2023  Assigned GA 26 w + 3 d  Assigned CHET: 2024    Fetal Biometry  ============    Standard  BPD 61.9 mm 25w 1d 7% Hadlock  OFD 77.6 mm 25w 3d 19% Irving  .3 mm 24w 3d <1% Hadlock  .0 mm 26w 3d 39% Hadlock  Femur 47.8 mm 26w 0d 23% Hadlock  Humerus 43.3 mm 25w 6d 26% Irving   g 25w 5d 22% Hadlock  EFW (lb) 1 lb  EFW (oz) 15 oz  EFW by: Hadlock (BPD-HC-AC-FL)  Other Structures   bpm    General Evaluation  ==============    Cardiac activity present.  bpm. Fetal movements: visualized. Presentation: Cephalic  Placenta: Placental site: left lateral  Umbilical cord: Cord vessels: 3 vessel cord. Insertion site: SUBOPTIMAL  Amniotic fluid: MVP 2.3 cm. ÁNGELA 9.3 cm. Q1 2.3 cm, Q2 2.2 cm, Q3 2.7 cm, Q4 2.2 cm    Fetal Anatomy  ===========    Lips: normal  3-vessel view: normal  Heart / Thorax  Ductal arch view: normal  Cord insertion: SUBOPTIMAL  Stomach: normal  Kidneys: normal  Bladder: normal  Lumbar spine: NOT VISUALIZED  Sacral spine: NOT VISUALIZED  Feet: normal  Rt hand: normal  Rt fingers: normal  Lt hand: NOT VISUALIZED  Lt fingers: NOT VISUALIZED  Fetal sex: XY  Wants to know fetal sex: yes    Findings  =======    Follow up ultrasound (32445)    This is a [place] pregnancy with biometry consistent with prior dating.  Anatomy

## 2023-10-16 ENCOUNTER — ROUTINE PRENATAL (OUTPATIENT)
Age: 33
End: 2023-10-16
Payer: COMMERCIAL

## 2023-10-16 VITALS — HEART RATE: 76 BPM | DIASTOLIC BLOOD PRESSURE: 64 MMHG | SYSTOLIC BLOOD PRESSURE: 100 MMHG

## 2023-10-16 DIAGNOSIS — Z3A.28 28 WEEKS GESTATION OF PREGNANCY: Primary | ICD-10-CM

## 2023-10-16 PROCEDURE — 76816 OB US FOLLOW-UP PER FETUS: CPT | Performed by: OBSTETRICS & GYNECOLOGY

## 2023-10-16 PROCEDURE — 99212 OFFICE O/P EST SF 10 MIN: CPT | Performed by: OBSTETRICS & GYNECOLOGY

## 2023-10-16 NOTE — PROCEDURES
PATIENT: Estevan Main   -  : 1990   -  DOS:10/16/2023   -  INTERPRETING PROVIDER:Jennifer Armenta,   Indication  ========    Fetal anatomy survey . Suspected oligohydramnios    Method  ======    Transabdominal ultrasound examination. View: Good view, limited by fetal position    Pregnancy  =========    [place] pregnancy. Number of fetuses: 1    Dating  ======    LMP on: 2023  GA by LMP 29 w + 2 d  CHET by LMP: 2024  Ultrasound examination on: 10/16/2023  GA by U/S based upon: [facility], BPD, Femur, HC  GA by U/S 27 w + 5 d  CHET by U/S: 1/10/2024  Assigned: based on the LMP, selected on 2023  Assigned GA 28 w + 2 d  Assigned CHET: 2024    Fetal Biometry  ============    Standard  BPD 67.9 mm 27w 2d 12% Hadlock  OFD 90.3 mm 29w 1d 72% Irving  .7 mm 27w 4d 7% Hadlock  .1 mm 28w 4d 52% Hadlock  Femur 51.1 mm 27w 3d 13% Hadlock  EFW 1,156 g 27w 5d 27% Hadlock  EFW (lb) 2 lb  EFW (oz) 9 oz  EFW by: Hadlock (BPD-HC-AC-FL)  Extended   5.9 mm  Other Structures   bpm    General Evaluation  ==============    Cardiac activity present.  bpm. Fetal movements: visualized. Presentation: Cephalic  Placenta: Placental site: left lateral  Umbilical cord: Cord vessels: 3 vessel cord. Insertion site: SUBOPTIMAL  Amniotic fluid: Amount of AF: normal. MVP 2.8 cm.  ÁNGELA 9.3 cm. Q1 1.7 cm, Q2 2.7 cm, Q3 2.8 cm, Q4 2.2 cm    Fetal Anatomy  ===========    Cranium: visualized  Lateral ventricles: visualized  Cavum septi pellucidi: visualized  Lips: visualized  Nose: visualized  4-chamber view: visualized  Cord insertion: SUBOPTIMAL  Stomach: normal  Kidneys: normal  Bladder: normal  Lumbar spine: SUBOPTIMAL  Sacral spine: SUBOPTIMAL  Lt hand: SUBOPTIMAL  Lt fingers: visualized  Position of feet: visualized  Fetal sex: male  Wants to know fetal sex: yes    Biophysical Profile  ==============    2: Fetal breathing movements  2: Gross body movements  2: Fetal tone  2: Amniotic fluid volume  8/8

## 2023-12-15 LAB — GBS, EXTERNAL RESULT: NEGATIVE

## 2023-12-28 ENCOUNTER — HOSPITAL ENCOUNTER (EMERGENCY)
Facility: HOSPITAL | Age: 33
Discharge: HOME | End: 2023-12-29
Payer: COMMERCIAL

## 2023-12-28 VITALS
HEART RATE: 71 BPM | RESPIRATION RATE: 16 BRPM | TEMPERATURE: 97.6 F | SYSTOLIC BLOOD PRESSURE: 112 MMHG | DIASTOLIC BLOOD PRESSURE: 65 MMHG

## 2023-12-28 PROCEDURE — 99284 EMERGENCY DEPT VISIT MOD MDM: CPT

## 2023-12-29 PROCEDURE — 99285 EMERGENCY DEPT VISIT HI MDM: CPT

## 2023-12-29 NOTE — ED PROVIDER NOTES
Department of Obstetrics and Gynecology  Nurse Practitioner [facility] Obstetrics History and Physical        CHIEF COMPLAINT:  contractions    HISTORY OF PRESENT ILLNESS:      The patient is a 29 y.o.  2 parity 1001 at 40w9d with an CHET of 23. Patient presents to the [facility] for contractions. Pt reports she has been having BH contractions for several days, but today they are different. She worked all day (dental hygienist) and has noticed over last couple hours she is more sore in abd, especially on top and the ctx are a little more painful. Theya re about every 5-7 minutes and a 4/10 on the pain scale. Denies LOF and VB. Endorses good fetal movement. PRENATAL CARE:    [facility] Dr Shruthi Church    Pregnancy complicated by oligohydramniosAFI at 34 weeks 9.3cm. Pt had COVID during this pregnancy, normal growth scans. Pt also wth a history of asthma- no medications for management.  Pt is GBS negative    Blood Type/Rh:  O Positive  Antibody Screen:  begtive  Rubella:  Immune  T Pall:  non reactive  Hepatitis B Surface Antigen: negtive  HIV:  non reactive  Gonorrhea:  negative  Chlamydia:  negative  MSAFP/Multiple Markers:  negative  U/S Structural Survery:  normal  1 hour Glucose Tolerance Test:  119  Group B Strep:  negative    OB History    Para Term  AB Living   2 1 1     1   SAB IAB Ectopic Molar Multiple Live Births             1      # Outcome Date GA Lbr Luis F/2nd Weight Sex Delivery Anes PTL Lv   2 Current            1 Term 21     Vag-Spont EPI  QIANA       Past Medical History:        Diagnosis Date    Asthma      Past Surgical History:        Procedure Laterality Date    CHOLECYSTECTOMY, LAPAROSCOPIC  2019    OTHER SURGICAL HISTORY  2018    Gall Bladder    OTHER SURGICAL HISTORY  2010    wisdom teeth     Family History:       Problem Relation Age of Onset    No Known Problems Mother     Cancer Paternal Uncle     Depression Father     Hypertension Father     Diabetes Father

## 2023-12-29 NOTE — ED TRIAGE NOTES
0000 ARASELI Brothers at bedside for evaluation and plan of care. 0002 SVE at this time by ARASELI Ng CNM: posterior    Plan discharge home. [id number] Discharge instructions reviewed with patient; all questions answered at this time; patient verbalized understanding and agreement with plan of care. Patient has appointment tomorrow in the office and was encouraged to keep that appointment; encouraged to call doctor or return to hospital with any concerns. 0020 Patient declined wheelchair; ambulated off of unit in stable condition with SO at her side.

## 2023-12-29 NOTE — PROGRESS NOTES
2140~  Patient arrived from home after onset of contractions this evening. Contractions have been 5-7 minutes apart for the past 4-5 hours. 2150~  Call placed to [address] [place] [address] to evaluate patient. 2152~  ANTONETTE Ng CNM in to see patient. SVE.    2155~  Decision made to have patient walk/sit on birthing ball for two hours then recheck. 2200~  Patient given a jug of water. 2202~  Patient off monitor to walk in [place].    2330~  SBAR report to MARY Hummel.

## 2023-12-31 ENCOUNTER — HOSPITAL ENCOUNTER (INPATIENT)
Facility: HOSPITAL | Age: 33
LOS: 2 days | Discharge: HOME | End: 2024-01-02
Attending: OBSTETRICS & GYNECOLOGY | Admitting: OBSTETRICS & GYNECOLOGY
Payer: COMMERCIAL

## 2023-12-31 PROBLEM — O47.9 UTERINE CONTRACTIONS: Status: ACTIVE | Noted: 2023-12-31

## 2023-12-31 LAB
ABO + RH BLD: NORMAL
BLOOD BANK CMNT PATIENT-IMP: NORMAL
ERYTHROCYTE [DISTWIDTH] IN BLOOD BY AUTOMATED COUNT: 12.7 % (ref 11.5–14.5)
HCT VFR BLD AUTO: 33.1 % (ref 35–47)
HGB BLD-MCNC: 11.3 G/DL (ref 11.5–16)
MCH RBC QN AUTO: 29.5 PG (ref 26–34)
MCHC RBC AUTO-ENTMCNC: 34.1 G/DL (ref 30–36.5)
MCV RBC AUTO: 86.4 FL (ref 80–99)
NRBC # BLD: 0 K/UL (ref 0–0.01)
NRBC BLD-RTO: 0 PER 100 WBC
PLATELET # BLD AUTO: 243 K/UL (ref 150–400)
PMV BLD AUTO: 10.7 FL (ref 8.9–12.9)
RBC # BLD AUTO: 3.83 M/UL (ref 3.8–5.2)
WBC # BLD AUTO: 10.3 K/UL (ref 3.6–11)

## 2023-12-31 PROCEDURE — 6370000000 HC RX 637 (ALT 250 FOR IP): Performed by: ADVANCED PRACTICE MIDWIFE

## 2023-12-31 PROCEDURE — 4A1HXCZ MONITORING OF PRODUCTS OF CONCEPTION, CARDIAC RATE, EXTERNAL APPROACH: ICD-10-PCS | Performed by: OBSTETRICS & GYNECOLOGY

## 2023-12-31 PROCEDURE — 36415 COLL VENOUS BLD VENIPUNCTURE: CPT

## 2023-12-31 PROCEDURE — 86901 BLOOD TYPING SEROLOGIC RH(D): CPT

## 2023-12-31 PROCEDURE — 86900 BLOOD TYPING SEROLOGIC ABO: CPT

## 2023-12-31 PROCEDURE — 4500000002 HC ER NO CHARGE

## 2023-12-31 PROCEDURE — 7220000101 HC DELIVERY VAGINAL/SINGLE

## 2023-12-31 PROCEDURE — 6360000002 HC RX W HCPCS: Performed by: ADVANCED PRACTICE MIDWIFE

## 2023-12-31 PROCEDURE — 2580000003 HC RX 258: Performed by: ADVANCED PRACTICE MIDWIFE

## 2023-12-31 PROCEDURE — 2500000003 HC RX 250 WO HCPCS

## 2023-12-31 PROCEDURE — 7210000100 HC LABOR FEE PER 1 HR

## 2023-12-31 PROCEDURE — 99283 EMERGENCY DEPT VISIT LOW MDM: CPT

## 2023-12-31 PROCEDURE — 0HQ9XZZ REPAIR PERINEUM SKIN, EXTERNAL APPROACH: ICD-10-PCS | Performed by: OBSTETRICS & GYNECOLOGY

## 2023-12-31 PROCEDURE — 1120000000 HC RM PRIVATE OB

## 2023-12-31 PROCEDURE — 85027 COMPLETE CBC AUTOMATED: CPT

## 2023-12-31 RX ORDER — ONDANSETRON 2 MG/ML
4 INJECTION INTRAMUSCULAR; INTRAVENOUS EVERY 4 HOURS PRN
Status: DISCONTINUED | OUTPATIENT
Start: 2023-12-31 | End: 2024-01-01

## 2023-12-31 RX ORDER — SODIUM CHLORIDE, SODIUM LACTATE, POTASSIUM CHLORIDE, AND CALCIUM CHLORIDE .6; .31; .03; .02 G/100ML; G/100ML; G/100ML; G/100ML
1000 INJECTION, SOLUTION INTRAVENOUS PRN
Status: DISCONTINUED | OUTPATIENT
Start: 2023-12-31 | End: 2024-01-01

## 2023-12-31 RX ORDER — LIDOCAINE HYDROCHLORIDE 10 MG/ML
INJECTION, SOLUTION INFILTRATION; PERINEURAL
Status: COMPLETED
Start: 2023-12-31 | End: 2023-12-31

## 2023-12-31 RX ORDER — EPHEDRINE SULFATE 50 MG/ML
10 INJECTION INTRAVENOUS
Status: DISCONTINUED | OUTPATIENT
Start: 2023-12-31 | End: 2024-01-01

## 2023-12-31 RX ORDER — SODIUM CHLORIDE 0.9 % (FLUSH) 0.9 %
5-40 SYRINGE (ML) INJECTION EVERY 12 HOURS SCHEDULED
Status: DISCONTINUED | OUTPATIENT
Start: 2023-12-31 | End: 2024-01-01

## 2023-12-31 RX ORDER — SODIUM CHLORIDE, SODIUM LACTATE, POTASSIUM CHLORIDE, AND CALCIUM CHLORIDE .6; .31; .03; .02 G/100ML; G/100ML; G/100ML; G/100ML
500 INJECTION, SOLUTION INTRAVENOUS PRN
Status: DISCONTINUED | OUTPATIENT
Start: 2023-12-31 | End: 2024-01-01

## 2023-12-31 RX ORDER — SODIUM CHLORIDE 0.9 % (FLUSH) 0.9 %
5-40 SYRINGE (ML) INJECTION PRN
Status: DISCONTINUED | OUTPATIENT
Start: 2023-12-31 | End: 2024-01-01

## 2023-12-31 RX ORDER — TERBUTALINE SULFATE 1 MG/ML
0.25 INJECTION, SOLUTION SUBCUTANEOUS
Status: DISCONTINUED | OUTPATIENT
Start: 2023-12-31 | End: 2024-01-01

## 2023-12-31 RX ORDER — ONDANSETRON 2 MG/ML
4 INJECTION INTRAMUSCULAR; INTRAVENOUS EVERY 6 HOURS PRN
Status: DISCONTINUED | OUTPATIENT
Start: 2023-12-31 | End: 2024-01-01

## 2023-12-31 RX ORDER — DIPHENHYDRAMINE HCL 25 MG
25 CAPSULE ORAL EVERY 4 HOURS PRN
Status: DISCONTINUED | OUTPATIENT
Start: 2023-12-31 | End: 2024-01-01

## 2023-12-31 RX ORDER — NALOXONE HYDROCHLORIDE 0.4 MG/ML
INJECTION, SOLUTION INTRAMUSCULAR; INTRAVENOUS; SUBCUTANEOUS PRN
Status: DISCONTINUED | OUTPATIENT
Start: 2023-12-31 | End: 2024-01-01

## 2023-12-31 RX ORDER — LIDOCAINE HCL/EPINEPHRINE/PF 2%-1:200K
VIAL (ML) INJECTION
Status: DISCONTINUED
Start: 2023-12-31 | End: 2024-01-01

## 2023-12-31 RX ORDER — IBUPROFEN 400 MG/1
800 TABLET ORAL EVERY 6 HOURS PRN
Status: DISCONTINUED | OUTPATIENT
Start: 2023-12-31 | End: 2024-01-01

## 2023-12-31 RX ORDER — SODIUM CHLORIDE, SODIUM LACTATE, POTASSIUM CHLORIDE, CALCIUM CHLORIDE 600; 310; 30; 20 MG/100ML; MG/100ML; MG/100ML; MG/100ML
INJECTION, SOLUTION INTRAVENOUS CONTINUOUS
Status: DISCONTINUED | OUTPATIENT
Start: 2023-12-31 | End: 2024-01-01

## 2023-12-31 RX ORDER — FENTANYL 0.2 MG/100ML-BUPIV 0.125%-NACL 0.9% EPIDURAL INJ 2/0.125 MCG/ML-%
1-15 SOLUTION INJECTION CONTINUOUS
Status: DISCONTINUED | OUTPATIENT
Start: 2023-12-31 | End: 2024-01-01

## 2023-12-31 RX ORDER — BUPIVACAINE HYDROCHLORIDE 2.5 MG/ML
INJECTION, SOLUTION EPIDURAL; INFILTRATION; INTRACAUDAL
Status: DISCONTINUED
Start: 2023-12-31 | End: 2024-01-01

## 2023-12-31 RX ORDER — SODIUM CHLORIDE 9 MG/ML
25 INJECTION, SOLUTION INTRAVENOUS PRN
Status: DISCONTINUED | OUTPATIENT
Start: 2023-12-31 | End: 2024-01-01

## 2023-12-31 RX ADMIN — OXYTOCIN 87.3 MILLI-UNITS/MIN: 10 INJECTION, SOLUTION INTRAMUSCULAR; INTRAVENOUS at 20:20

## 2023-12-31 RX ADMIN — LIDOCAINE HYDROCHLORIDE 100 MG: 10 INJECTION, SOLUTION INFILTRATION; PERINEURAL at 20:24

## 2023-12-31 RX ADMIN — SODIUM CHLORIDE, PRESERVATIVE FREE 10 ML: 5 INJECTION INTRAVENOUS at 13:05

## 2023-12-31 RX ADMIN — IBUPROFEN 800 MG: 400 TABLET, FILM COATED ORAL at 22:19

## 2023-12-31 SDOH — ECONOMIC STABILITY: HOUSING INSECURITY
IN THE LAST 12 MONTHS, WAS THERE A TIME WHEN YOU DID NOT HAVE A STEADY PLACE TO SLEEP OR SLEPT IN A SHELTER (INCLUDING NOW)?: YES

## 2023-12-31 SDOH — ECONOMIC STABILITY: INCOME INSECURITY: IN THE LAST 12 MONTHS, WAS THERE A TIME WHEN YOU WERE NOT ABLE TO PAY THE MORTGAGE OR RENT ON TIME?: NO

## 2023-12-31 NOTE — PROGRESS NOTES
1325 G 2 P 1 pt of ANTONETTE Ng CNM admitted to [facility] # 4 from [facility] with complaint of possible ROM (ruled out) and contractions over a couple days. Pt was 4/70/-2. Denies current pregnancy complications. Was followed for Oligio in early third trimester but latest ÁNGELA 10. GBS negative.   1531 Fetal Uterine monitor applied  1410 Reative NST. Monitor off. Up to ambulate and walk the stairs,  1518 Returned to bed. Monitor on. Pt states contractions feel stronger when walking.   1606 reactive tracing. Monitor off. Instructed in [employer]  1610 Pt in knee chest  1625 Exaggerated left lying position  1650 Doing side lunges on stool in room  1720 [employer] complete. Monitor reapplied. Pt states she is feeling contractions lower in her abdomen  1802 Monitor off. Up to ambulate. ANTONETTE Ng in discussing plan of care.   1856 Returned to left lateral. Monitor reapplied. Pt working well through contractions. Using slow deep breathing.  1908 Pt choked drinking water while laying on her side. Sat up and coughing. Pulse ox 99 %  1915. Pt sitting up and feeling better  1935 Bedside and Verbal shift change report given to AILEEN Villanueva RN (oncoming nurse) by FREDERICK Zuleta RN (offgoing nurse). Report included the following information Nurse Handoff Report, MAR, and Event Log.

## 2023-12-31 NOTE — ED PROVIDER NOTES
Department of Obstetrics and Gynecology  Nurse Practitioner [facility] Obstetrics History and Physical        CHIEF COMPLAINT:  contractions    HISTORY OF PRESENT ILLNESS:      The patient is a 33 y.o.  2 parity 1001 at 39w1d with an CHET of 23.  Patient presents to the [facility] for contractions. Reports her contractions restarted last night and have gotten more intense throughout the morning. They are now about every 5 min and a 6/10 on the pain scale. Denies LOF, reports bloody show, but no hussein vaginal bleeding. Endorses good fetal movement. Was seen three days ago in [facility] for contractions and was 1cm at that time.       PRENATAL CARE:    Provider:  [facility], Dr Vasquez     Pregnancy complicated by oligohydramniosAFI at 34 weeks 9.3cm. Pt had COVID during this pregnancy, normal growth scans. Pt also wth a history of asthma- no medications for management. Pt is GBS negative     Blood Type/Rh:  O Positive  Antibody Screen:  begtive  Rubella:  Immune  T Pall:  non reactive  Hepatitis B Surface Antigen: negtive  HIV:  non reactive  Gonorrhea:  negative  Chlamydia:  negative  MSAFP/Multiple Markers:  negative  U/S Structural Survery:  normal  1 hour Glucose Tolerance Test:  119  Group B Strep:  negative    OB History    Para Term  AB Living   2 1 1     1   SAB IAB Ectopic Molar Multiple Live Births             1      # Outcome Date GA Lbr Luis F/2nd Weight Sex Delivery Anes PTL Lv   2 Current            1 Term 21     Vag-Spont EPI  QIANA     Past Medical History:        Diagnosis Date    Asthma      Past Surgical History:        Procedure Laterality Date    CHOLECYSTECTOMY, LAPAROSCOPIC  2019    OTHER SURGICAL HISTORY  2018    Gall Bladder    OTHER SURGICAL HISTORY  2010    wisdom teeth     Family History:       Problem Relation Age of Onset    No Known Problems Mother     Cancer Paternal Uncle     Depression Father     Hypertension Father     Diabetes Father      Medications Prior to

## 2023-12-31 NOTE — H&P
Problem Relation Age of Onset    No Known Problems Mother      Cancer Paternal Uncle      Depression Father      Hypertension Father      Diabetes Father           Medications Prior to Admission:  Prescriptions Prior to Admission   Not in a hospital admission.     Allergies:  Patient has no known allergies.     REVIEW OF SYSTEMS:     ROS negative except as outlined above     PHYSICAL EXAM:     Vitals       Vitals:     12/31/23 1230   BP: 112/76   Pulse: 71   Resp: 16   Temp: 98.1 °F (36.7 °C)   TempSrc: Oral   SpO2: 98%            General appearance:  awake, alert, cooperative, no apparent distress, and appears stated age  Neurologic:  Awake, alert, oriented to name, place and time.  Cranial nerves II-XII are grossly intact.  Motor is 5 out of 5 bilaterally.  Cerebellar finger to nose, heel to shin intact.  Sensory is intact.  Babinski down going, Romberg negative, and gait is normal.  Lungs:  No increased work of breathing, good air exchange, clear to auscultation bilaterally, no crackles or wheezing  Heart:  Normal apical impulse, regular rate and rhythm, normal S1 and S2, no S3 or S4, and no murmur noted  Abdomen:  No scars, normal bowel sounds, soft, non-distended, non-tender, no masses palpated, no hepatosplenomegally  Fetal heart rate:  Baseline Heart Rate 125, accelerations:  present  long term variability:  moderate  decelerations:  absent  Reactive, cat 1     Cervix:     DILATION:  4 cm  EFFACEMENT:   70  STATION:  -2 cm  CONSISTENCY:  medium  POSITION:  mid  Vertex     Contraction frequency:  5 minutes, mild to moderate to palpation, resting tone soft  Membranes:  Intact- palpated     ASSESSMENT AND PLAN:     Early labor  O Positive  GBS Neg  Hx asthma  Rubella Immune  Hep B and HIV neg     Active Problems:  Admit to l and d for expectant management.  IV, CBC, STBB  Expectant management, recheck cervix with maternal or fetal indication.   Maternal and fetal monitoring per protocol  Anticipate vaginal

## 2023-12-31 NOTE — PROGRESS NOTES
1219 Patient received ambulatory under services of Dr. Vasquez to [facility] # 3 for evaluation. Complains of contractions, onset last evening around 1800, with increased intensity and frequency since 0400 today. Also complains of vaginal spotting, onset 0800 today. Unsure if leaking fluid or feeling spotting. Denies current headache, visual disturbance, epigastric pain of increased swelling of face, hands or feet.   1226 External fetal monitor applied  1240 ANTONETTE Ng CNM notified of patient's arrival to [facility] #3  1252 ANTONETTE Ng CNM at bedside  1255 SVE by ANTONETTE Ng CNM, 4/70/-2, JOSE ALEJANDRO, plan to admit to L&D  1305 IV inserted # 20 g to Lwrist,good blood return, labs drawn, site intact, saline flushed.  1317 External fetal monitor off, transferred to R # 4 ambulatory   1320 TRANSFER - OUT REPORT:    Bedside and Verbal report given to DANIEL Martin on Denise Joshua  being transferred to [facility] #4 for routine progression of patient care       Report consisted of patient's Situation, Background, Assessment and   Recommendations(SBAR).     Information from the following report(s) Nurse Handoff Report was reviewed with the receiving nurse.           Lines:   Peripheral IV 12/31/23 Distal;Left;Posterior Forearm (Active)   Site Assessment Clean, dry & intact 12/31/23 1307   Line Care Connections checked and tightened 12/31/23 1307   Phlebitis Assessment No symptoms 12/31/23 1307   Infiltration Assessment 0 12/31/23 1307   Alcohol Cap Used Yes 12/31/23 1307   Dressing Status Clean, dry & intact;New dressing applied 12/31/23 1307   Dressing Type Transparent 12/31/23 1307        Opportunity for questions and clarification was provided.      Patient transported ambulatory with:  Registered Nurse

## 2024-01-01 PROCEDURE — 6370000000 HC RX 637 (ALT 250 FOR IP): Performed by: ADVANCED PRACTICE MIDWIFE

## 2024-01-01 PROCEDURE — 1120000000 HC RM PRIVATE OB

## 2024-01-01 RX ORDER — ONDANSETRON 4 MG/1
8 TABLET, ORALLY DISINTEGRATING ORAL EVERY 8 HOURS PRN
Status: DISCONTINUED | OUTPATIENT
Start: 2024-01-01 | End: 2024-01-02 | Stop reason: HOSPADM

## 2024-01-01 RX ORDER — SIMETHICONE 80 MG
80 TABLET,CHEWABLE ORAL EVERY 6 HOURS PRN
Status: DISCONTINUED | OUTPATIENT
Start: 2024-01-01 | End: 2024-01-02 | Stop reason: HOSPADM

## 2024-01-01 RX ORDER — ACETAMINOPHEN 500 MG
1000 TABLET ORAL EVERY 8 HOURS SCHEDULED
Status: DISCONTINUED | OUTPATIENT
Start: 2024-01-01 | End: 2024-01-02 | Stop reason: HOSPADM

## 2024-01-01 RX ORDER — IBUPROFEN 400 MG/1
800 TABLET ORAL EVERY 8 HOURS SCHEDULED
Status: DISCONTINUED | OUTPATIENT
Start: 2024-01-01 | End: 2024-01-02 | Stop reason: HOSPADM

## 2024-01-01 RX ORDER — OXYCODONE HYDROCHLORIDE 5 MG/1
5 TABLET ORAL EVERY 4 HOURS PRN
Status: DISCONTINUED | OUTPATIENT
Start: 2024-01-01 | End: 2024-01-02 | Stop reason: HOSPADM

## 2024-01-01 RX ORDER — OXYCODONE HYDROCHLORIDE 5 MG/1
10 TABLET ORAL EVERY 4 HOURS PRN
Status: DISCONTINUED | OUTPATIENT
Start: 2024-01-01 | End: 2024-01-02 | Stop reason: HOSPADM

## 2024-01-01 RX ORDER — PNV NO.95/FERROUS FUM/FOLIC AC 28MG-0.8MG
1 TABLET ORAL DAILY
Status: DISCONTINUED | OUTPATIENT
Start: 2024-01-01 | End: 2024-01-02 | Stop reason: HOSPADM

## 2024-01-01 RX ORDER — MODIFIED LANOLIN
OINTMENT (GRAM) TOPICAL PRN
Status: DISCONTINUED | OUTPATIENT
Start: 2024-01-01 | End: 2024-01-02 | Stop reason: HOSPADM

## 2024-01-01 RX ADMIN — IBUPROFEN 800 MG: 400 TABLET, FILM COATED ORAL at 18:30

## 2024-01-01 RX ADMIN — IBUPROFEN 800 MG: 400 TABLET, FILM COATED ORAL at 06:53

## 2024-01-01 ASSESSMENT — PAIN DESCRIPTION - ORIENTATION: ORIENTATION: MID

## 2024-01-01 ASSESSMENT — PAIN DESCRIPTION - LOCATION: LOCATION: ABDOMEN

## 2024-01-01 ASSESSMENT — PAIN SCALES - GENERAL: PAINLEVEL_OUTOF10: 1

## 2024-01-01 ASSESSMENT — PAIN DESCRIPTION - DESCRIPTORS: DESCRIPTORS: CRAMPING

## 2024-01-01 ASSESSMENT — PAIN - FUNCTIONAL ASSESSMENT: PAIN_FUNCTIONAL_ASSESSMENT: ACTIVITIES ARE NOT PREVENTED

## 2024-01-01 NOTE — LACTATION NOTE
This note was copied from a baby's chart.  Initial Lactation consultation - Baby born vaginally yesterday to a  mom at 39 /7 weeks gestation. Mom states she breast fed her first child for 2 months and then her supply dropped. Mom states this baby has been latching and nursing well. She said she is hearing him swallow while nursing.     Mom will continue to feed the baby according to his feeding cues. She will not limit the time the baby is at the breast and will offer both breasts at each feeding.

## 2024-01-01 NOTE — PROCEDURES
CNM Delivery Note       Patient: Denise Joshau MRN: [id number]  SSN: [id number]    YOB: 1990  Age: 33 y.o.  Sex: female        Complete cervical dilation at .  of a live male infant at  with Apgars 8,9 in GEMA position under no anesthesia with mother in reclined position. Loose nuchal x 1 noted and easily reduced on perineum. Shoulders spontaneous, easily delivered with maternal effort. Vigorous infant placed on maternal abdomen immediately following delivery. Once cord stopped pulsating it was double clamped by CNM and cut by FOB. Cord blood collected and sent to lab. Placenta and membranes spontaneous, intact, with 3 vessel cord via Verdin mechanism at 2019. Fundus massaged to firm. Vagina and perineum inspected. Small 1st degree perineal laceration repaired with 3-0 vicryl rapide under local anesthesia to great approximation, hemostasis and cosmesis. Fundal massage, bleeding WNL, fundus firm. Mother and infant stable, bonding, establishing breastfeeding. See flow for QBL.

## 2024-01-01 NOTE — PROGRESS NOTES
DIETER Labor Progress Note     Patient: Denise Joshua MRN: [id number]  SSN: [id number]    YOB: 1990  Age: 33 y.o.  Sex: female        Subjective:   Patient is now very comfortable with epidural, in good spirits, although tired. Is ready to proceed with SVE and determine next step.  is at her bedside providing support and encouragement.     Objective:   Blood pressure 120/70, pulse 69, temperature 97.8 °F (36.6 °C), temperature source Oral, resp. rate 16, last menstrual period 2023, SpO2 98 %.  Fetal heart baseline 125, moderate variability, positive accelerations, positive early decelerations, Uterine contractions q 2-4 minutes, strong to palpation, resting tone soft, Sterile Vaginal Exam: 6 cm dilated/ 90 % effaced/ -1 station, cervix anterior, fetal presentation vertex, membranes AROM for small amount of blood tinged fluid    Assessment:     39w1d  Category 1 fetal heart rate tracing   Active Labor     Plan:   Excellent cervical change!   Discussed AROM, r/b, all questions answered. Pt desires, verbalized consent.   AROM performed without complication, pt and fetus tolerated well. Very small fluid return, will continue to monitor to confirm rupture.  Epidural for pain control PRN  Recheck cervix with maternal or fetal indication, limit SVEs now that pt is ruptured.  Cont maternal and fetal monitoring per protocol  Anticipate     HAYLEY Israel - DIETER

## 2024-01-01 NOTE — PROGRESS NOTES
0: Bedside and Verbal shift change report given to KERMIT Villanueva RN (oncoming nurse) by REINA Zuleta RN (offgoing nurse). Report included the following information Nurse Handoff Report, Adult Overview, Intake/Output, MAR, Recent Results, and Event Log.    : ARASELI Ng CNM at the bedside to assess pt and discuss plan of care. AROM w/ clear scant fluid. SVE /-1. Pt requesting epidural at this time.    : Pt states she needs to push. ARASELI Ng notified and at the bedside.     : Pt started pushing.     2014:  of live baby boy!

## 2024-01-01 NOTE — PROGRESS NOTES
Patient: Annalee Parra MRN: [id number]  SSN: [id number]    YOB: 1991  Age: 32 y.o.  Sex: female          Subjective:   Patient coping well with contractions, they are getting more intense. Has to stop and focus to get through them. Is on hands and knees in bed.  remains at bedside and is very supportive and encouraging.     Objective:   Blood pressure 139/87, pulse 94, temperature 98.1 °F (36.7 °C), temperature source Oral, resp. rate 16.  Fetal heart baseline 130, moderate variability, positive accelerations, negative decelerations, Uterine contractions q 4-5 minutes, moderate to palpation, resting tone soft, Sterile Vaginal Exam: deferred, fetal presentation vertex, membranes intact      Assessment:     40w2d  Category 1 fetal heart rate tracing   Labor         Plan:   Recheck cervix in 2 hours, sooner with maternal or fetal indication. If unchanged consider augmentation.  Cont maternal and fetal monitoring per protocol  Epidural PRN  Anticipate      HAYLEY Israel - DIETER

## 2024-01-01 NOTE — PROGRESS NOTES
Post-Partum Day Number 1 Progress Note    Denise Joshua     Assessment: Doing well, post partum day 1  Asthma - stable    Plan:  - Continue routine postpartum and perineal care as well as maternal education.  - declines  circ  - Plan discharge home tomorrow.    Information for the patient's :  Timoteo Joshua [[id number]]   Vaginal, Spontaneous  Patient doing well without significant complaint.  Voiding without difficulty, normal lochia.    Vitals:  /66   Pulse 60   Temp 97.8 °F (36.6 °C) (Oral)   Resp 16   LMP 2023 (Approximate)   SpO2 97%   Breastfeeding Unknown   Temp (24hrs), Av.1 °F (36.7 °C), Min:97.8 °F (36.6 °C), Max:98.8 °F (37.1 °C)        Exam:   Patient without distress.                  Fundus firm, nontender per nursing fundal checks.                Perineum with normal lochia noted per nursing assessment.                Lower extremities are negative for pathological edema.    Labs:     Lab Results   Component Value Date/Time    WBC 10.3 2023 01:15 PM    WBC 14.6 2022 05:01 PM    WBC 10.3 2021 08:44 PM    HGB 11.3 2023 01:15 PM    HGB 12.1 2022 05:01 PM    HGB 10.2 2021 08:44 PM    HCT 33.1 2023 01:15 PM    HCT 36.1 2022 05:01 PM    HCT 32.0 2021 08:44 PM     2023 01:15 PM     2022 05:01 PM     2021 08:44 PM       Recent Results (from the past 24 hour(s))   CBC    Collection Time: 23  1:15 PM   Result Value Ref Range    WBC 10.3 3.6 - 11.0 K/uL    RBC 3.83 3.80 - 5.20 M/uL    Hemoglobin 11.3 (L) 11.5 - 16.0 g/dL    Hematocrit 33.1 (L) 35.0 - 47.0 %    MCV 86.4 80.0 - 99.0 FL    MCH 29.5 26.0 - 34.0 PG    MCHC 34.1 30.0 - 36.5 g/dL    RDW 12.7 11.5 - 14.5 %    Platelets 243 150 - 400 K/uL    MPV 10.7 8.9 - 12.9 FL    Nucleated RBCs 0.0 0  WBC    nRBC 0.00 0.00 - 0.01 K/uL   ABO/RH    Collection Time: 23  1:18 PM   Result Value Ref Range    ABO/Rh O

## 2024-01-02 VITALS
HEART RATE: 68 BPM | RESPIRATION RATE: 16 BRPM | TEMPERATURE: 98.3 F | DIASTOLIC BLOOD PRESSURE: 71 MMHG | OXYGEN SATURATION: 98 % | SYSTOLIC BLOOD PRESSURE: 118 MMHG

## 2024-01-02 PROCEDURE — 6370000000 HC RX 637 (ALT 250 FOR IP): Performed by: ADVANCED PRACTICE MIDWIFE

## 2024-01-02 RX ORDER — IBUPROFEN 800 MG/1
800 TABLET ORAL EVERY 8 HOURS PRN
Qty: 30 TABLET | Refills: 1 | Status: SHIPPED | OUTPATIENT
Start: 2024-01-02

## 2024-01-02 RX ADMIN — IBUPROFEN 800 MG: 400 TABLET, FILM COATED ORAL at 03:03

## 2024-01-02 RX ADMIN — ACETAMINOPHEN 1000 MG: 500 TABLET ORAL at 00:00

## 2024-01-02 RX ADMIN — IBUPROFEN 800 MG: 400 TABLET, FILM COATED ORAL at 10:51

## 2024-01-02 ASSESSMENT — PAIN DESCRIPTION - LOCATION
LOCATION: ABDOMEN
LOCATION: PERINEUM

## 2024-01-02 ASSESSMENT — PAIN DESCRIPTION - DESCRIPTORS
DESCRIPTORS: ACHING;CRAMPING
DESCRIPTORS: CRAMPING;SORE

## 2024-01-02 ASSESSMENT — PAIN SCALES - GENERAL
PAINLEVEL_OUTOF10: 2
PAINLEVEL_OUTOF10: 1
PAINLEVEL_OUTOF10: 2

## 2024-01-02 ASSESSMENT — PAIN DESCRIPTION - ORIENTATION
ORIENTATION: LOWER
ORIENTATION: LOWER

## 2024-01-02 ASSESSMENT — PAIN - FUNCTIONAL ASSESSMENT: PAIN_FUNCTIONAL_ASSESSMENT: ACTIVITIES ARE NOT PREVENTED

## 2024-01-02 NOTE — LACTATION NOTE
This note was copied from a baby's chart.  Baby nursing well and has improved throughout post partum stay, deep latch maintained, mother is comfortable, milk is in transition, baby feeding vigorously with rhythmic suck, swallow, breathe pattern, with audible swallowing, and evident milk transfer, both breasts offered, baby is asleep following feeding. Baby is feeding on demand, voiding and stools present as appropriate since birth. Weight loss:  6.3%    Breasts may become engorged when milk \"comes in\".  How milk is made / normal phases of milk production, supply and demand discussed.  Taught care of engorged breasts - frequent breastfeeding encouraged and breast massage ac. Then nurse the baby (or pump minimally for comfort). Apply cold compresses ac and/or pc x 15 minutes a few times a day for swelling or discomfort if necessary.  May need to do this care for a couple of days.Discussed prevention and treatment of mastitis.

## 2024-01-02 NOTE — DISCHARGE INSTRUCTIONS
dealing with a tremendous amount of uncertainty, confusion and disruption to our daily lives, which may result in increased anxiety, depression and fear. If you are feeling unsettled or worse, please know that we are here to help. During this time of increased caution and care for one another, [facility] [place] ([facility]) is offering virtual and in person support groups to ALL MOTHERS in [place] regardless of the age of your child/children as a way to help weather this emotional storm together. Social support is an important part of self-care during this time of physical distancing.  Virtual postpartum support group meetings available at www.[email or web address].org  Warm Line: 938.711.8023    Breastfeeding Support Groups   1st and 3rd Wednesday of each month at [facility]  2nd and 4th Tuesday of each month at [facility] (in education center behind cafeteria)    [email or web address]

## 2024-01-02 NOTE — PROGRESS NOTES
Post-Partum Day Number 1 Progress Note    Denise Joshua     Assessment: Doing well, post partum day 1    Plan:  - Continue routine postpartum and perineal care as well as maternal education.  - Plan discharge home today.    Information for the patient's :  Timoteo Joshua [[id number]]   Vaginal, Spontaneous  Patient doing well without significant complaint.  Voiding without difficulty, normal lochia.    Vitals:  /72   Pulse 58   Temp 98.3 °F (36.8 °C) (Oral)   Resp 16   LMP 2023 (Approximate)   SpO2 99%   Breastfeeding Unknown   Temp (24hrs), Av.3 °F (36.8 °C), Min:98 °F (36.7 °C), Max:98.8 °F (37.1 °C)        Exam:   Patient without distress.                  Fundus firm, nontender per nursing fundal checks.                Perineum with normal lochia noted per nursing assessment.                Lower extremities are negative for pathological edema.    Labs:     Lab Results   Component Value Date/Time    WBC 10.3 2023 01:15 PM    WBC 14.6 2022 05:01 PM    WBC 10.3 2021 08:44 PM    HGB 11.3 2023 01:15 PM    HGB 12.1 2022 05:01 PM    HGB 10.2 2021 08:44 PM    HCT 33.1 2023 01:15 PM    HCT 36.1 2022 05:01 PM    HCT 32.0 2021 08:44 PM     2023 01:15 PM     2022 05:01 PM     2021 08:44 PM       No results found for this or any previous visit (from the past 24 hour(s)).

## 2024-01-02 NOTE — DISCHARGE SUMMARY
Obstetrical Discharge Summary     Name: Denise Joshua MRN: [id number]  SSN: [id number]    YOB: 1990  Age: 33 y.o.  Sex: female      Admit Date: 2023    Discharge Date: 2024     Admitting Physician: Austyn Elder MD     Attending Physician:  Elizabeth Vasquez MD     Admission Diagnoses: Uterine contractions [O47.9]    Discharge Diagnoses:   Information for the patient's :  Timoteo Joshua [[id number]]   @[id number]@      Additional Diagnoses:  No components found for: \"OBEXTABORH\", \"OBEXTABSCRN\", \"OBEXTRUBELLA\", \"OBEXTGRBS\"    Hospital Course: Normal hospital course following the delivery.    Patient Instructions:   Current Discharge Medication List        START taking these medications    Details   ibuprofen (ADVIL;MOTRIN) 800 MG tablet Take 1 tablet by mouth every 8 hours as needed for Pain  Qty: 30 tablet, Refills: 1           CONTINUE these medications which have NOT CHANGED    Details   Prenatal Vit-Fe Fumarate-FA (PRENATAL VITAMINS PO) Take by mouth      Ascorbic Acid (VITAMIN C) 250 MG tablet Take 1 tablet by mouth daily      magnesium 30 MG tablet Take 1 tablet by mouth 2 times daily      albuterol sulfate HFA (PROVENTIL;VENTOLIN;PROAIR) 108 (90 Base) MCG/ACT inhaler Inhale 2 puffs into the lungs every 4 hours as needed             Disposition at Discharge: Home or self care    Condition at Discharge: Stable    Reference my discharge instructions.    No follow-ups on file.     Signed By:  Kiko Martinez MD     2024

## 2024-09-12 PROBLEM — E27.1: Status: ACTIVE | Noted: 2024-09-12

## 2024-09-12 RX ORDER — COSYNTROPIN 0.25 MG/ML
250 INJECTION, POWDER, FOR SOLUTION INTRAMUSCULAR; INTRAVENOUS ONCE
OUTPATIENT
Start: 2024-09-20 | End: 2024-09-20

## 2024-09-12 RX ORDER — SODIUM CHLORIDE 9 MG/ML
5-250 INJECTION, SOLUTION INTRAVENOUS PRN
OUTPATIENT
Start: 2024-09-20

## 2024-09-20 ENCOUNTER — HOSPITAL ENCOUNTER (OUTPATIENT)
Facility: HOSPITAL | Age: 34
Setting detail: INFUSION SERIES
End: 2024-09-20

## 2024-09-20 DIAGNOSIS — E27.1 PRIMARY ADRENAL DEFICIENCY (HCC): Primary | ICD-10-CM

## 2024-09-27 ENCOUNTER — HOSPITAL ENCOUNTER (OUTPATIENT)
Facility: HOSPITAL | Age: 34
Setting detail: INFUSION SERIES
End: 2024-09-27

## 2024-09-27 DIAGNOSIS — E27.1 PRIMARY ADRENAL DEFICIENCY (HCC): Primary | ICD-10-CM

## 2024-10-11 ENCOUNTER — HOSPITAL ENCOUNTER (OUTPATIENT)
Facility: HOSPITAL | Age: 34
Setting detail: INFUSION SERIES
Discharge: HOME | End: 2024-10-11
Payer: COMMERCIAL

## 2024-10-11 VITALS
TEMPERATURE: 98.1 F | HEART RATE: 63 BPM | SYSTOLIC BLOOD PRESSURE: 103 MMHG | WEIGHT: 173 LBS | OXYGEN SATURATION: 97 % | DIASTOLIC BLOOD PRESSURE: 68 MMHG | RESPIRATION RATE: 16 BRPM | BODY MASS INDEX: 30.65 KG/M2 | HEIGHT: 63 IN

## 2024-10-11 DIAGNOSIS — E27.1 PRIMARY ADRENAL DEFICIENCY (HCC): Primary | ICD-10-CM

## 2024-10-11 LAB
CORTIS 1H P CHAL SERPL-MCNC: 33.7 UG/DL
CORTIS 30M P CHAL SERPL-MCNC: 31.9 UG/DL
CORTIS BS SERPL-MCNC: 18.9 UG/DL

## 2024-10-11 PROCEDURE — 96374 THER/PROPH/DIAG INJ IV PUSH: CPT

## 2024-10-11 PROCEDURE — 6360000002 HC RX W HCPCS: Performed by: STUDENT IN AN ORGANIZED HEALTH CARE EDUCATION/TRAINING PROGRAM

## 2024-10-11 PROCEDURE — 82024 ASSAY OF ACTH: CPT

## 2024-10-11 PROCEDURE — 82533 TOTAL CORTISOL: CPT

## 2024-10-11 PROCEDURE — 36415 COLL VENOUS BLD VENIPUNCTURE: CPT

## 2024-10-11 RX ORDER — COSYNTROPIN 0.25 MG/ML
250 INJECTION, POWDER, FOR SOLUTION INTRAMUSCULAR; INTRAVENOUS ONCE
Status: COMPLETED | OUTPATIENT
Start: 2024-10-11 | End: 2024-10-11

## 2024-10-11 RX ORDER — COSYNTROPIN 0.25 MG/ML
250 INJECTION, POWDER, FOR SOLUTION INTRAMUSCULAR; INTRAVENOUS ONCE
Status: CANCELLED | OUTPATIENT
Start: 2024-10-18 | End: 2024-10-18

## 2024-10-11 RX ORDER — SODIUM CHLORIDE 9 MG/ML
5-250 INJECTION, SOLUTION INTRAVENOUS PRN
OUTPATIENT
Start: 2024-10-18

## 2024-10-11 RX ADMIN — COSYNTROPIN 250 MCG: 0.25 INJECTION, POWDER, LYOPHILIZED, FOR SOLUTION INTRAMUSCULAR; INTRAVENOUS at 08:07

## 2024-10-11 ASSESSMENT — PAIN SCALES - GENERAL: PAINLEVEL_OUTOF10: 0

## 2024-10-11 NOTE — PROGRESS NOTES
[facility] Progress Note        Date: 10/11/24    Name: Denise Joshua    MRN: [id number]         : 1990    MD: Moni Amato*       Ms. Joshua admitted to [facility] for Cortisol Stim Test ambulatory in stable condition. Assessment completed, no acute issues at this time. No new concerns voiced.  24 gauge peripheral IV obtained in the LAC without difficulty, line flushed and capped Labs drawn peripherally and sent for processing.          Vitals:    10/11/24 0749   BP: 103/68   Pulse: 63   Resp: 16   Temp: 98.1 °F (36.7 °C)   TempSrc: Temporal   SpO2: 97%   Weight: 78.5 kg (173 lb)   Height: 1.6 m (5' 3\")           Baseline labs were drawn. Cortrosyn administered. Labs drawn at 30 mins and 60 mins post injection.        Medications:  MEDICATIONS GIVEN:  Medications Administered         cosyntropin (CORTROSYN) injection 250 mcg Admin Date  10/11/2024 Action  New Bag Dose  250 mcg Route  IntraVENous Documented By  Ramses Alfaro, RN              Pt tolerated treatment well, no adverse reactions noted. PIV maintained positive blood return throughout treatment, flushed with positive blood return at conclusion and removed and wrapped in coban per protocol. D/c home ambulatory in no distress. Patient is aware of no further appointments at this time.

## 2024-10-12 LAB — ACTH PLAS-MCNC: 43.3 PG/ML (ref 7.2–63.3)

## (undated) DEVICE — INFECTION CONTROL KIT SYS

## (undated) DEVICE — REM POLYHESIVE ADULT PATIENT RETURN ELECTRODE: Brand: VALLEYLAB

## (undated) DEVICE — NEEDLE HYPO 22GA L1.5IN BLK S STL HUB POLYPR SHLD REG BVL

## (undated) DEVICE — (D)PREP SKN CHLRAPRP APPL 26ML -- CONVERT TO ITEM 371833

## (undated) DEVICE — SUTURE MCRYL SZ 4-0 L27IN ABSRB UD L19MM PS-2 1/2 CIR PRIM Y426H

## (undated) DEVICE — DRAPE,UTILTY,TAPE,15X26, 4EA/PK: Brand: MEDLINE

## (undated) DEVICE — SURGICAL PROCEDURE KIT GEN LAPAROSCOPY LF

## (undated) DEVICE — STERILE POLYISOPRENE POWDER-FREE SURGICAL GLOVES WITH EMOLLIENT COATING: Brand: PROTEXIS

## (undated) DEVICE — CLICKLINE SCISSORS INSERT: Brand: CLICKLINE

## (undated) DEVICE — BLADELESS OPTICAL TROCAR WITH FIXATION CANNULA: Brand: VERSAPORT

## (undated) DEVICE — UNIVERSAL FIXATION CANNULA: Brand: VERSAONE

## (undated) DEVICE — GARMENT,MEDLINE,DVT,INT,CALF,MED, GEN2: Brand: MEDLINE

## (undated) DEVICE — BLADELESS OPTICAL TROCAR WITH FIXATION CANNULA: Brand: VERSAONE

## (undated) DEVICE — SYRINGE MED 20ML STD CLR PLAS LUERLOCK TIP N CTRL DISP

## (undated) DEVICE — APPLIER LIG CLP 5MM CONTAIN 16 TI CLP DISP ENDO CLP

## (undated) DEVICE — DEVON™ KNEE AND BODY STRAP 60" X 3" (1.5 M X 7.6 CM): Brand: DEVON

## (undated) DEVICE — ELECTRODE ES 36CM LAP FLAT L HK COAT DISP CLEANCOAT

## (undated) DEVICE — FILTER SMK EVAC FLO CLR MEGADYNE

## (undated) DEVICE — SUTURE SZ 0 27IN 5/8 CIR UR-6  TAPER PT VIOLET ABSRB VICRYL J603H

## (undated) DEVICE — Device

## (undated) DEVICE — SPECIMEN RETRIEVAL POUCH: Brand: ENDO CATCH GOLD

## (undated) DEVICE — TUBING INSUFLTN 10FT LUER -- CONVERT TO ITEM 368568

## (undated) DEVICE — APPLICATOR BNDG 1MM ADH PREMIERPRO EXOFIN

## (undated) DEVICE — TROCAR SITE CLOSURE DEVICE: Brand: ENDO CLOSE

## (undated) DEVICE — 3000CC GUARDIAN II: Brand: GUARDIAN